# Patient Record
Sex: FEMALE | Race: OTHER | Employment: OTHER | URBAN - METROPOLITAN AREA
[De-identification: names, ages, dates, MRNs, and addresses within clinical notes are randomized per-mention and may not be internally consistent; named-entity substitution may affect disease eponyms.]

---

## 2017-02-09 ENCOUNTER — OFFICE VISIT (OUTPATIENT)
Dept: INTERNAL MEDICINE CLINIC | Facility: CLINIC | Age: 72
End: 2017-02-09

## 2017-02-09 VITALS
WEIGHT: 160 LBS | DIASTOLIC BLOOD PRESSURE: 74 MMHG | HEART RATE: 68 BPM | HEIGHT: 60.7 IN | SYSTOLIC BLOOD PRESSURE: 110 MMHG | OXYGEN SATURATION: 97 % | BODY MASS INDEX: 30.6 KG/M2

## 2017-02-09 DIAGNOSIS — N30.90 CYSTITIS: ICD-10-CM

## 2017-02-09 DIAGNOSIS — M81.0 POSTMENOPAUSAL OSTEOPOROSIS: ICD-10-CM

## 2017-02-09 DIAGNOSIS — Z00.00 ANNUAL PHYSICAL EXAM: Primary | ICD-10-CM

## 2017-02-09 DIAGNOSIS — Z23 NEED FOR INFLUENZA VACCINATION: ICD-10-CM

## 2017-02-09 DIAGNOSIS — M15.9 PRIMARY OSTEOARTHRITIS INVOLVING MULTIPLE JOINTS: ICD-10-CM

## 2017-02-09 DIAGNOSIS — E78.2 MIXED HYPERLIPIDEMIA: ICD-10-CM

## 2017-02-09 DIAGNOSIS — E03.9 HYPOTHYROIDISM (ACQUIRED): ICD-10-CM

## 2017-02-09 DIAGNOSIS — F41.1 GENERALIZED ANXIETY DISORDER: ICD-10-CM

## 2017-02-09 LAB
ALBUMIN SERPL BCP-MCNC: 4.2 G/DL (ref 3.5–4.8)
ALBUMIN/GLOB SERPL: 1.2 {RATIO} (ref 1–2)
ALP SERPL-CCNC: 69 U/L (ref 32–100)
ALT SERPL-CCNC: 18 U/L (ref 14–54)
ANION GAP SERPL CALC-SCNC: 6 MMOL/L (ref 0–18)
AST SERPL-CCNC: 23 U/L (ref 15–41)
BILIRUB SERPL-MCNC: 0.8 MG/DL (ref 0.3–1.2)
BUN SERPL-MCNC: 15 MG/DL (ref 8–20)
BUN/CREAT SERPL: 16.3 (ref 10–20)
CALCIUM SERPL-MCNC: 9.8 MG/DL (ref 8.5–10.5)
CHLORIDE SERPL-SCNC: 108 MMOL/L (ref 95–110)
CHOLEST SERPL-MCNC: 157 MG/DL (ref 110–200)
CO2 SERPL-SCNC: 28 MMOL/L (ref 22–32)
CREAT SERPL-MCNC: 0.92 MG/DL (ref 0.5–1.5)
GLOBULIN PLAS-MCNC: 3.4 G/DL (ref 2.5–3.7)
GLUCOSE SERPL-MCNC: 92 MG/DL (ref 70–99)
HDLC SERPL-MCNC: 44 MG/DL
LDLC SERPL CALC-MCNC: 94 MG/DL (ref 0–99)
MULTISTIX LOT#: ABNORMAL NUMERIC
NITRITE, URINE: POSITIVE
NONHDLC SERPL-MCNC: 113 MG/DL
OSMOLALITY UR CALC.SUM OF ELEC: 294 MOSM/KG (ref 275–295)
PH, URINE: 7 (ref 4.5–8)
POTASSIUM SERPL-SCNC: 4.2 MMOL/L (ref 3.3–5.1)
PROT SERPL-MCNC: 7.6 G/DL (ref 5.9–8.4)
PROTEIN (URINE DIPSTICK): 30 MG/DL
SODIUM SERPL-SCNC: 142 MMOL/L (ref 136–144)
SPECIFIC GRAVITY: 1.02 (ref 1–1.03)
TRIGL SERPL-MCNC: 96 MG/DL (ref 1–149)
TSH SERPL-ACNC: 2.56 UIU/ML (ref 0.34–5.6)
UROBILINOGEN,SEMI-QN: 0.2 MG/DL (ref 0–1.9)

## 2017-02-09 PROCEDURE — 84443 ASSAY THYROID STIM HORMONE: CPT | Performed by: FAMILY MEDICINE

## 2017-02-09 PROCEDURE — 80061 LIPID PANEL: CPT | Performed by: FAMILY MEDICINE

## 2017-02-09 PROCEDURE — G0008 ADMIN INFLUENZA VIRUS VAC: HCPCS | Performed by: FAMILY MEDICINE

## 2017-02-09 PROCEDURE — 99213 OFFICE O/P EST LOW 20 MIN: CPT | Performed by: FAMILY MEDICINE

## 2017-02-09 PROCEDURE — 81003 URINALYSIS AUTO W/O SCOPE: CPT | Performed by: FAMILY MEDICINE

## 2017-02-09 PROCEDURE — 80053 COMPREHEN METABOLIC PANEL: CPT | Performed by: FAMILY MEDICINE

## 2017-02-09 PROCEDURE — 90662 IIV NO PRSV INCREASED AG IM: CPT | Performed by: FAMILY MEDICINE

## 2017-02-09 PROCEDURE — 82306 VITAMIN D 25 HYDROXY: CPT | Performed by: FAMILY MEDICINE

## 2017-02-09 RX ORDER — CIPROFLOXACIN 250 MG/1
250 TABLET, FILM COATED ORAL 2 TIMES DAILY
Qty: 10 TABLET | Refills: 0 | Status: SHIPPED | OUTPATIENT
Start: 2017-02-09 | End: 2017-02-14

## 2017-02-09 NOTE — PROGRESS NOTES
CC:  Physical      Hx of CC:  ANNUAL MEDICARE PHYSICAL AND FOLLOWING UP ON CHRONIC CONDITIONS. ONE WEEK WITH URINARY FREQUENCY AND DYSURIA.     Vitals:    02/09/17  1024   BP: 110/74   Pulse: 68   Height: 60.7\"   Weight: 160 lb   SpO2: 97%         Body ma

## 2017-02-09 NOTE — PROGRESS NOTES
Pt presented to clinic today for blood draw. Per physician able to draw orders. Orders  documented within chart. Pt tolerated lab draw well.  verified.   Orders drawn include: CMP, lipid, TSH and VitD  Site of draw: right arm  ASHANTI Wang

## 2017-02-10 LAB — 25(OH)D3 SERPL-MCNC: 40.3 NG/ML

## 2017-02-21 ENCOUNTER — OFFICE VISIT (OUTPATIENT)
Dept: ORTHOPEDICS CLINIC | Facility: CLINIC | Age: 72
End: 2017-02-21

## 2017-02-21 ENCOUNTER — HOSPITAL ENCOUNTER (OUTPATIENT)
Dept: GENERAL RADIOLOGY | Facility: HOSPITAL | Age: 72
Discharge: HOME | End: 2017-02-21
Attending: ORTHOPAEDIC SURGERY | Admitting: ORTHOPAEDIC SURGERY
Payer: MEDICARE

## 2017-02-21 DIAGNOSIS — S42.412D CLOSED DISPLACED SIMPLE SUPRACONDYLAR FRACTURE OF LEFT HUMERUS WITHOUT INTERCONDYLAR FRACTURE WITH ROUTINE HEALING, SUBSEQUENT ENCOUNTER: ICD-10-CM

## 2017-02-21 DIAGNOSIS — Z47.89 ORTHOPEDIC AFTERCARE: ICD-10-CM

## 2017-02-21 DIAGNOSIS — Z47.89 ORTHOPEDIC AFTERCARE: Primary | ICD-10-CM

## 2017-02-21 PROCEDURE — 99024 POSTOP FOLLOW-UP VISIT: CPT | Performed by: ORTHOPAEDIC SURGERY

## 2017-02-21 PROCEDURE — G0463 HOSPITAL OUTPT CLINIC VISIT: HCPCS | Performed by: ORTHOPAEDIC SURGERY

## 2017-02-21 PROCEDURE — 73080 X-RAY EXAM OF ELBOW: CPT

## 2017-02-21 NOTE — PROGRESS NOTES
2/21/2017  Odin Fish  6/29/1945  66year old   female  Blaze Roberts MD    HPI:   Patient presents with:  Post-Op: s/p L elbow 3 mo f/u - here with her nephew who translates for her  - states she feels like she has a rubberband inside the elbow an supracondylar fracture of left humerus without intercondylar fracture with routine healing, subsequent encounter, 3 mo s/p ORIF left distal humerus    The patient has evidence of healing both clinically and radiographically and may now advance activities a

## 2017-03-06 RX ORDER — ATENOLOL 50 MG/1
TABLET ORAL
Qty: 90 TABLET | Refills: 0 | Status: SHIPPED | OUTPATIENT
Start: 2017-03-06 | End: 2017-07-01

## 2017-03-06 RX ORDER — RANITIDINE 150 MG/1
TABLET ORAL
Qty: 90 TABLET | Refills: 0 | Status: SHIPPED | OUTPATIENT
Start: 2017-03-06 | End: 2017-07-01

## 2017-03-06 RX ORDER — LEVOTHYROXINE SODIUM 0.03 MG/1
TABLET ORAL
Qty: 90 TABLET | Refills: 0 | Status: SHIPPED | OUTPATIENT
Start: 2017-03-06 | End: 2017-07-01

## 2017-04-06 DIAGNOSIS — E78.2 MIXED HYPERLIPIDEMIA: Primary | ICD-10-CM

## 2017-04-06 RX ORDER — SIMVASTATIN 40 MG
TABLET ORAL
Qty: 90 TABLET | Refills: 1 | Status: SHIPPED | OUTPATIENT
Start: 2017-04-06 | End: 2017-07-01

## 2017-07-01 ENCOUNTER — OFFICE VISIT (OUTPATIENT)
Dept: INTERNAL MEDICINE CLINIC | Facility: CLINIC | Age: 72
End: 2017-07-01

## 2017-07-01 VITALS
HEIGHT: 60.7 IN | DIASTOLIC BLOOD PRESSURE: 70 MMHG | OXYGEN SATURATION: 97 % | BODY MASS INDEX: 31.18 KG/M2 | TEMPERATURE: 98 F | SYSTOLIC BLOOD PRESSURE: 120 MMHG | WEIGHT: 163 LBS | HEART RATE: 64 BPM

## 2017-07-01 DIAGNOSIS — F41.1 GENERALIZED ANXIETY DISORDER: ICD-10-CM

## 2017-07-01 DIAGNOSIS — Z12.31 VISIT FOR SCREENING MAMMOGRAM: ICD-10-CM

## 2017-07-01 DIAGNOSIS — E78.2 MIXED HYPERLIPIDEMIA: ICD-10-CM

## 2017-07-01 DIAGNOSIS — G44.219 EPISODIC TENSION-TYPE HEADACHE, NOT INTRACTABLE: ICD-10-CM

## 2017-07-01 DIAGNOSIS — H52.13 MYOPIA, BILATERAL: Primary | ICD-10-CM

## 2017-07-01 PROBLEM — H52.10 MYOPIA: Status: ACTIVE | Noted: 2017-07-01

## 2017-07-01 PROCEDURE — 99214 OFFICE O/P EST MOD 30 MIN: CPT | Performed by: FAMILY MEDICINE

## 2017-07-01 RX ORDER — LEVOTHYROXINE SODIUM 0.03 MG/1
25 TABLET ORAL
Qty: 90 TABLET | Refills: 1 | Status: SHIPPED | OUTPATIENT
Start: 2017-07-01 | End: 2017-12-26

## 2017-07-01 RX ORDER — SIMVASTATIN 40 MG
40 TABLET ORAL NIGHTLY
Qty: 90 TABLET | Refills: 1 | Status: SHIPPED | OUTPATIENT
Start: 2017-07-01 | End: 2018-01-22

## 2017-07-01 RX ORDER — ALPRAZOLAM 0.5 MG/1
TABLET ORAL
Qty: 60 TABLET | Refills: 5 | Status: SHIPPED | OUTPATIENT
Start: 2017-07-01 | End: 2018-03-17

## 2017-07-01 RX ORDER — SERTRALINE HYDROCHLORIDE 100 MG/1
100 TABLET, FILM COATED ORAL
Qty: 90 TABLET | Refills: 1 | Status: SHIPPED | OUTPATIENT
Start: 2017-07-01 | End: 2018-10-01

## 2017-07-01 RX ORDER — ATENOLOL 50 MG/1
50 TABLET ORAL DAILY
Qty: 90 TABLET | Refills: 1 | Status: SHIPPED | OUTPATIENT
Start: 2017-07-01 | End: 2017-12-26

## 2017-07-01 RX ORDER — RANITIDINE 150 MG/1
150 TABLET ORAL NIGHTLY
Qty: 90 TABLET | Refills: 1 | Status: SHIPPED | OUTPATIENT
Start: 2017-07-01 | End: 2017-12-26

## 2017-07-01 NOTE — PROGRESS NOTES
CC:  Eye Problem (pt presents to clinic c/o eye pressure. needs order for mammogram and optho )      Hx of CC:  SEVERAL WEEKS WITH FRONTAL PAIN/HEADACHE. NOT PRESENT IN THE MORNINGS ON AWAKENING BUT DEVELOPS AS DAY GOES BY.   PATIENT HAS A COUPLE DIFFERENT AFUA SCREENING BILAT (CPT=77067); Future    OPHTHALMOLOGY - INTERNAL  AFUA SCREENING BILAT (CPT=77067)  No orders of the defined types were placed in this encounter.

## 2017-08-12 ENCOUNTER — HOSPITAL ENCOUNTER (OUTPATIENT)
Dept: MAMMOGRAPHY | Facility: HOSPITAL | Age: 72
Discharge: HOME | End: 2017-08-12
Attending: FAMILY MEDICINE
Payer: MEDICARE

## 2017-08-12 DIAGNOSIS — Z12.31 VISIT FOR SCREENING MAMMOGRAM: ICD-10-CM

## 2017-08-12 PROCEDURE — 77067 SCR MAMMO BI INCL CAD: CPT | Performed by: FAMILY MEDICINE

## 2017-10-31 ENCOUNTER — OFFICE VISIT (OUTPATIENT)
Dept: INTERNAL MEDICINE CLINIC | Facility: CLINIC | Age: 72
End: 2017-10-31

## 2017-10-31 VITALS
OXYGEN SATURATION: 99 % | WEIGHT: 161 LBS | RESPIRATION RATE: 17 BRPM | HEIGHT: 60.7 IN | DIASTOLIC BLOOD PRESSURE: 88 MMHG | SYSTOLIC BLOOD PRESSURE: 134 MMHG | HEART RATE: 59 BPM | BODY MASS INDEX: 30.79 KG/M2

## 2017-10-31 DIAGNOSIS — Z00.00 ANNUAL PHYSICAL EXAM: Primary | ICD-10-CM

## 2017-10-31 DIAGNOSIS — R09.82 POST-NASAL DRIP: ICD-10-CM

## 2017-10-31 DIAGNOSIS — J30.89 CHRONIC NON-SEASONAL ALLERGIC RHINITIS, UNSPECIFIED TRIGGER: ICD-10-CM

## 2017-10-31 DIAGNOSIS — E03.9 HYPOTHYROIDISM (ACQUIRED): ICD-10-CM

## 2017-10-31 DIAGNOSIS — M81.0 OSTEOPOROSIS, POST-MENOPAUSAL: ICD-10-CM

## 2017-10-31 DIAGNOSIS — Z23 NEED FOR INFLUENZA VACCINATION: ICD-10-CM

## 2017-10-31 DIAGNOSIS — E78.2 MIXED HYPERLIPIDEMIA: ICD-10-CM

## 2017-10-31 PROCEDURE — 80061 LIPID PANEL: CPT | Performed by: FAMILY MEDICINE

## 2017-10-31 PROCEDURE — 80053 COMPREHEN METABOLIC PANEL: CPT | Performed by: FAMILY MEDICINE

## 2017-10-31 PROCEDURE — G0439 PPPS, SUBSEQ VISIT: HCPCS | Performed by: FAMILY MEDICINE

## 2017-10-31 PROCEDURE — G0008 ADMIN INFLUENZA VIRUS VAC: HCPCS | Performed by: FAMILY MEDICINE

## 2017-10-31 PROCEDURE — 84443 ASSAY THYROID STIM HORMONE: CPT | Performed by: FAMILY MEDICINE

## 2017-10-31 PROCEDURE — 90686 IIV4 VACC NO PRSV 0.5 ML IM: CPT | Performed by: FAMILY MEDICINE

## 2017-10-31 RX ORDER — DIPHENHYDRAMINE HYDROCHLORIDE 12.5 MG/1
12.5 BAR, CHEWABLE ORAL NIGHTLY PRN
Qty: 90 TABLET | Refills: 0 | Status: SHIPPED | OUTPATIENT
Start: 2017-10-31 | End: 2019-12-07 | Stop reason: ALTCHOICE

## 2017-10-31 RX ORDER — FLUTICASONE PROPIONATE 50 MCG
1 SPRAY, SUSPENSION (ML) NASAL DAILY
Qty: 3 BOTTLE | Refills: 1 | Status: SHIPPED | OUTPATIENT
Start: 2017-10-31 | End: 2021-03-09

## 2017-10-31 RX ORDER — ALENDRONATE SODIUM 35 MG/1
35 TABLET ORAL
Qty: 12 TABLET | Refills: 3 | Status: SHIPPED | OUTPATIENT
Start: 2017-10-31 | End: 2019-01-07

## 2017-10-31 NOTE — PROGRESS NOTES
HPI:   Kristin Dueñas is a 74year old female who presents for a Medicare Subsequent Annual Wellness visit (Pt already had Initial Annual Wellness).          Patient Care Team: Patient Care Team:  Dana Hansen DO as PCP - General (Family Medicine)  [facility] 100 MG Oral Tab Take 1 tablet (100 mg total) by mouth once daily. ALPRAZolam 0.5 MG Oral Tab TAKE ONE TABLET BY MOUTH TWO TIMES A DAY AS NEEDED   Calcium Carbonate-Vitamin D (CALCIUM + D) 250-125 MG-UNIT Oral Tab Take 1 tablet by mouth.    Omega-3-acid Et the following:    Height as of this encounter: 60.7\". Weight as of this encounter: 161 lb.     Medicare Hearing Assessment  (Required for AWV/SWV)    Whispered Voice     Visual Acuity  Right Eye Visual Acuity: Corrected Right Eye Chart Acuity: 20/25   L Oral Tab; Take 1 tablet (35 mg total) by mouth every 7 days. CONTINUE WITH CALCIUM AND VITAMIN D SUPPLEMENTS      HAD COLONOSCOPY WITH DR. April Espinoza ON 2015-->ONE POLYP EXCISED   UP TO DATE ON MAMMO    Ms. Rojelio Chris does not currently take aspirin.   Diet Fall/Risk Assessment          Have you fallen in the last 12 months?: 0-No                                        Fall/Risk Scorin          Depression Screening (PHQ-2/PHQ-9): Over the LAST 2 WEEKS   Little interest or pleasure in doing things (ove Annually: Diabetics, FHx Glaucoma, AA>50, > 65 No flowsheet data found. Bone Density Screening      Dexascan Every two years No results found for this or any previous visit. No flowsheet data found.     Pap and Pelvic      Pap: Every 3 yrs age 22 Value   02/09/2017 4.2     POTASSIUM (P) (mmol/L)   Date Value   04/18/2016 4.1    No flowsheet data found.     Creatinine  Annually Creatinine (mg/dL)   Date Value   02/09/2017 0.92     CREATININE (P) (mg/dL)   Date Value   04/18/2016 0.81    No flowsheet

## 2017-12-26 DIAGNOSIS — G44.219 EPISODIC TENSION-TYPE HEADACHE, NOT INTRACTABLE: ICD-10-CM

## 2017-12-26 RX ORDER — LEVOTHYROXINE SODIUM 0.03 MG/1
TABLET ORAL
Qty: 90 TABLET | Refills: 0 | Status: SHIPPED | OUTPATIENT
Start: 2017-12-26 | End: 2018-03-26

## 2017-12-26 RX ORDER — RANITIDINE 150 MG/1
TABLET ORAL
Qty: 90 TABLET | Refills: 0 | Status: SHIPPED | OUTPATIENT
Start: 2017-12-26 | End: 2018-03-26

## 2017-12-26 RX ORDER — ATENOLOL 50 MG/1
TABLET ORAL
Qty: 90 TABLET | Refills: 0 | Status: SHIPPED | OUTPATIENT
Start: 2017-12-26 | End: 2018-03-26

## 2018-01-22 DIAGNOSIS — E78.2 MIXED HYPERLIPIDEMIA: ICD-10-CM

## 2018-01-22 RX ORDER — SIMVASTATIN 40 MG
TABLET ORAL
Qty: 90 TABLET | Refills: 0 | Status: SHIPPED | OUTPATIENT
Start: 2018-01-22 | End: 2018-04-25

## 2018-03-17 ENCOUNTER — OFFICE VISIT (OUTPATIENT)
Dept: INTERNAL MEDICINE CLINIC | Facility: CLINIC | Age: 73
End: 2018-03-17

## 2018-03-17 VITALS
HEART RATE: 62 BPM | OXYGEN SATURATION: 98 % | DIASTOLIC BLOOD PRESSURE: 84 MMHG | BODY MASS INDEX: 30.98 KG/M2 | SYSTOLIC BLOOD PRESSURE: 140 MMHG | TEMPERATURE: 99 F | HEIGHT: 60.7 IN | WEIGHT: 162 LBS

## 2018-03-17 DIAGNOSIS — F41.1 GENERALIZED ANXIETY DISORDER: ICD-10-CM

## 2018-03-17 DIAGNOSIS — J98.01 POST-INFECTION BRONCHOSPASM: Primary | ICD-10-CM

## 2018-03-17 PROCEDURE — 99213 OFFICE O/P EST LOW 20 MIN: CPT | Performed by: FAMILY MEDICINE

## 2018-03-17 RX ORDER — BUDESONIDE AND FORMOTEROL FUMARATE DIHYDRATE 160; 4.5 UG/1; UG/1
2 AEROSOL RESPIRATORY (INHALATION) 2 TIMES DAILY
Qty: 1 INHALER | Refills: 1 | Status: SHIPPED | OUTPATIENT
Start: 2018-03-17 | End: 2019-08-08 | Stop reason: ALTCHOICE

## 2018-03-17 RX ORDER — ALPRAZOLAM 0.5 MG/1
TABLET ORAL
Qty: 60 TABLET | Refills: 5 | Status: SHIPPED | OUTPATIENT
Start: 2018-03-17 | End: 2018-10-11

## 2018-03-17 NOTE — PROGRESS NOTES
CC:  URI (Pt presents to clinic for c/o chest congestion and cough x 2 weeks. Took 5 days of Penicillin that she had from [facility].)      Hx of CC:  HAD URI 2 WEEKS AGO WITH PERSISTENT COUGH AND RESIDUAL RHINITIS SINCE.   NEEDING REFILL ON ALPRAZOLAM--MOOD OK

## 2018-03-26 DIAGNOSIS — G44.219 EPISODIC TENSION-TYPE HEADACHE, NOT INTRACTABLE: ICD-10-CM

## 2018-03-26 RX ORDER — LEVOTHYROXINE SODIUM 0.03 MG/1
TABLET ORAL
Qty: 90 TABLET | Refills: 0 | Status: SHIPPED | OUTPATIENT
Start: 2018-03-26 | End: 2018-06-28

## 2018-03-26 RX ORDER — ATENOLOL 50 MG/1
TABLET ORAL
Qty: 90 TABLET | Refills: 0 | Status: SHIPPED | OUTPATIENT
Start: 2018-03-26 | End: 2018-06-26

## 2018-03-26 RX ORDER — RANITIDINE 150 MG/1
TABLET ORAL
Qty: 90 TABLET | Refills: 0 | Status: SHIPPED | OUTPATIENT
Start: 2018-03-26 | End: 2018-10-11

## 2018-04-25 DIAGNOSIS — E78.2 MIXED HYPERLIPIDEMIA: ICD-10-CM

## 2018-04-25 RX ORDER — SIMVASTATIN 40 MG
TABLET ORAL
Qty: 90 TABLET | Refills: 0 | Status: SHIPPED | OUTPATIENT
Start: 2018-04-25 | End: 2018-07-24

## 2018-06-18 ENCOUNTER — TELEPHONE (OUTPATIENT)
Dept: INTERNAL MEDICINE CLINIC | Facility: CLINIC | Age: 73
End: 2018-06-18

## 2018-06-18 DIAGNOSIS — Z12.39 BREAST CANCER SCREENING: Primary | ICD-10-CM

## 2018-06-26 DIAGNOSIS — G44.219 EPISODIC TENSION-TYPE HEADACHE, NOT INTRACTABLE: ICD-10-CM

## 2018-06-26 RX ORDER — ATENOLOL 50 MG/1
TABLET ORAL
Qty: 90 TABLET | Refills: 0 | Status: SHIPPED | OUTPATIENT
Start: 2018-06-26 | End: 2018-10-01

## 2018-06-28 RX ORDER — LEVOTHYROXINE SODIUM 0.03 MG/1
TABLET ORAL
Qty: 90 TABLET | Refills: 0 | Status: SHIPPED | OUTPATIENT
Start: 2018-06-28 | End: 2018-10-01

## 2018-07-24 DIAGNOSIS — E78.2 MIXED HYPERLIPIDEMIA: ICD-10-CM

## 2018-07-24 RX ORDER — SIMVASTATIN 40 MG
TABLET ORAL
Qty: 90 TABLET | Refills: 0 | Status: SHIPPED | OUTPATIENT
Start: 2018-07-24 | End: 2018-10-22

## 2018-08-21 DIAGNOSIS — M15.9 PRIMARY OSTEOARTHRITIS INVOLVING MULTIPLE JOINTS: Primary | ICD-10-CM

## 2018-10-01 DIAGNOSIS — G44.219 EPISODIC TENSION-TYPE HEADACHE, NOT INTRACTABLE: ICD-10-CM

## 2018-10-01 DIAGNOSIS — F41.1 GENERALIZED ANXIETY DISORDER: ICD-10-CM

## 2018-10-01 RX ORDER — ATENOLOL 50 MG/1
TABLET ORAL
Qty: 90 TABLET | Refills: 0 | Status: SHIPPED | OUTPATIENT
Start: 2018-10-01 | End: 2019-01-07

## 2018-10-01 RX ORDER — LEVOTHYROXINE SODIUM 0.03 MG/1
TABLET ORAL
Qty: 90 TABLET | Refills: 0 | Status: SHIPPED | OUTPATIENT
Start: 2018-10-01 | End: 2019-01-07

## 2018-10-01 RX ORDER — SERTRALINE HYDROCHLORIDE 100 MG/1
TABLET, FILM COATED ORAL
Qty: 90 TABLET | Refills: 0 | Status: SHIPPED | OUTPATIENT
Start: 2018-10-01 | End: 2019-01-07

## 2018-10-01 NOTE — TELEPHONE ENCOUNTER
A refill request was received for:  Requested Prescriptions     Pending Prescriptions Disp Refills   • ATENOLOL 50 MG Oral Tab [Pharmacy Med Name: Atenolol Oral Tablet 50 MG] 90 tablet 0     Sig: TAKE ONE TABLET BY MOUTH ONE TIME DAILY   • SERTRALINE HCL 1

## 2018-10-06 ENCOUNTER — HOSPITAL ENCOUNTER (OUTPATIENT)
Dept: MAMMOGRAPHY | Facility: HOSPITAL | Age: 73
Discharge: HOME | End: 2018-10-06
Attending: FAMILY MEDICINE
Payer: MEDICARE

## 2018-10-06 DIAGNOSIS — Z12.39 BREAST CANCER SCREENING: ICD-10-CM

## 2018-10-06 PROCEDURE — 77063 BREAST TOMOSYNTHESIS BI: CPT | Performed by: FAMILY MEDICINE

## 2018-10-06 PROCEDURE — 77067 SCR MAMMO BI INCL CAD: CPT | Performed by: FAMILY MEDICINE

## 2018-10-11 ENCOUNTER — OFFICE VISIT (OUTPATIENT)
Dept: INTERNAL MEDICINE CLINIC | Facility: CLINIC | Age: 73
End: 2018-10-11
Payer: MEDICARE

## 2018-10-11 VITALS
HEART RATE: 91 BPM | OXYGEN SATURATION: 99 % | WEIGHT: 163 LBS | SYSTOLIC BLOOD PRESSURE: 140 MMHG | HEIGHT: 60.7 IN | DIASTOLIC BLOOD PRESSURE: 92 MMHG | BODY MASS INDEX: 31.18 KG/M2 | RESPIRATION RATE: 17 BRPM

## 2018-10-11 DIAGNOSIS — Z23 NEED FOR INFLUENZA VACCINATION: ICD-10-CM

## 2018-10-11 DIAGNOSIS — K29.50 CHRONIC GASTRITIS WITHOUT BLEEDING, UNSPECIFIED GASTRITIS TYPE: ICD-10-CM

## 2018-10-11 DIAGNOSIS — N30.90 CYSTITIS: Primary | ICD-10-CM

## 2018-10-11 DIAGNOSIS — F41.1 GENERALIZED ANXIETY DISORDER: ICD-10-CM

## 2018-10-11 PROCEDURE — 87186 SC STD MICRODIL/AGAR DIL: CPT | Performed by: FAMILY MEDICINE

## 2018-10-11 PROCEDURE — 99213 OFFICE O/P EST LOW 20 MIN: CPT | Performed by: FAMILY MEDICINE

## 2018-10-11 PROCEDURE — 87088 URINE BACTERIA CULTURE: CPT | Performed by: FAMILY MEDICINE

## 2018-10-11 PROCEDURE — G0008 ADMIN INFLUENZA VIRUS VAC: HCPCS | Performed by: FAMILY MEDICINE

## 2018-10-11 PROCEDURE — 90653 IIV ADJUVANT VACCINE IM: CPT | Performed by: FAMILY MEDICINE

## 2018-10-11 PROCEDURE — 87086 URINE CULTURE/COLONY COUNT: CPT | Performed by: FAMILY MEDICINE

## 2018-10-11 PROCEDURE — 81003 URINALYSIS AUTO W/O SCOPE: CPT | Performed by: FAMILY MEDICINE

## 2018-10-11 RX ORDER — NITROFURANTOIN 25; 75 MG/1; MG/1
100 CAPSULE ORAL 2 TIMES DAILY
Qty: 14 CAPSULE | Refills: 0 | Status: SHIPPED | OUTPATIENT
Start: 2018-10-11 | End: 2018-12-11 | Stop reason: ALTCHOICE

## 2018-10-11 RX ORDER — RANITIDINE 150 MG/1
150 TABLET ORAL NIGHTLY
Qty: 90 TABLET | Refills: 1 | Status: SHIPPED | OUTPATIENT
Start: 2018-10-11 | End: 2019-05-13

## 2018-10-11 RX ORDER — ALPRAZOLAM 0.5 MG/1
TABLET ORAL
Qty: 60 TABLET | Refills: 5 | Status: SHIPPED | OUTPATIENT
Start: 2018-10-11 | End: 2019-05-13

## 2018-10-13 NOTE — PROGRESS NOTES
CC: UTI (Pt presents to clinic for c/o burning with urination and increased urinary frequency x 5 wks. )      Hx of CC:  ONSET OF DYSURIA AND FREQUENCY WHILE IN [place] 5 WEEKS AGO--TOOK SOME ANTIBIOTICS BUT SYMPTOMS NEVER FULLY RESOLVED.     NEEDING ME

## 2018-10-22 DIAGNOSIS — M15.9 PRIMARY OSTEOARTHRITIS INVOLVING MULTIPLE JOINTS: ICD-10-CM

## 2018-10-22 DIAGNOSIS — E78.2 MIXED HYPERLIPIDEMIA: ICD-10-CM

## 2018-10-22 RX ORDER — SIMVASTATIN 40 MG
TABLET ORAL
Qty: 90 TABLET | Refills: 0 | Status: SHIPPED | OUTPATIENT
Start: 2018-10-22 | End: 2019-01-07

## 2018-11-19 DIAGNOSIS — M15.9 PRIMARY OSTEOARTHRITIS INVOLVING MULTIPLE JOINTS: ICD-10-CM

## 2018-12-11 ENCOUNTER — OFFICE VISIT (OUTPATIENT)
Dept: INTERNAL MEDICINE CLINIC | Facility: CLINIC | Age: 73
End: 2018-12-11
Payer: MEDICARE

## 2018-12-11 VITALS
RESPIRATION RATE: 18 BRPM | DIASTOLIC BLOOD PRESSURE: 90 MMHG | WEIGHT: 172 LBS | BODY MASS INDEX: 32.9 KG/M2 | HEART RATE: 68 BPM | OXYGEN SATURATION: 99 % | SYSTOLIC BLOOD PRESSURE: 142 MMHG | HEIGHT: 60.7 IN

## 2018-12-11 DIAGNOSIS — E03.9 HYPOTHYROIDISM (ACQUIRED): ICD-10-CM

## 2018-12-11 DIAGNOSIS — Z00.00 ANNUAL PHYSICAL EXAM: Primary | ICD-10-CM

## 2018-12-11 DIAGNOSIS — E78.2 MIXED HYPERLIPIDEMIA: ICD-10-CM

## 2018-12-11 DIAGNOSIS — F41.1 GENERALIZED ANXIETY DISORDER: ICD-10-CM

## 2018-12-11 DIAGNOSIS — Z01.00 ENCOUNTER FOR VISION SCREENING: ICD-10-CM

## 2018-12-11 DIAGNOSIS — M15.9 PRIMARY OSTEOARTHRITIS INVOLVING MULTIPLE JOINTS: ICD-10-CM

## 2018-12-11 DIAGNOSIS — R00.2 INTERMITTENT PALPITATIONS: ICD-10-CM

## 2018-12-11 DIAGNOSIS — E03.9 ACQUIRED HYPOTHYROIDISM: ICD-10-CM

## 2018-12-11 DIAGNOSIS — H52.13 MYOPIA OF BOTH EYES: ICD-10-CM

## 2018-12-11 DIAGNOSIS — M81.0 OSTEOPOROSIS, POST-MENOPAUSAL: ICD-10-CM

## 2018-12-11 PROCEDURE — 80053 COMPREHEN METABOLIC PANEL: CPT | Performed by: FAMILY MEDICINE

## 2018-12-11 PROCEDURE — 36415 COLL VENOUS BLD VENIPUNCTURE: CPT | Performed by: FAMILY MEDICINE

## 2018-12-11 PROCEDURE — 93000 ELECTROCARDIOGRAM COMPLETE: CPT | Performed by: FAMILY MEDICINE

## 2018-12-11 PROCEDURE — 84443 ASSAY THYROID STIM HORMONE: CPT | Performed by: FAMILY MEDICINE

## 2018-12-11 PROCEDURE — G0439 PPPS, SUBSEQ VISIT: HCPCS | Performed by: FAMILY MEDICINE

## 2018-12-11 PROCEDURE — 80061 LIPID PANEL: CPT | Performed by: FAMILY MEDICINE

## 2018-12-11 NOTE — PROGRESS NOTES
Pt presented to clinic today for blood draw. Per physician able to draw orders. Orders  documented within chart. Pt tolerated lab draw well.  verified.   Orders drawn include: tsh, lipid, cmp   Site of draw: rt arm   Roger Boom, [facility]

## 2018-12-11 NOTE — PROGRESS NOTES
HPI:   Erik Quevedo is a 74year old female who presents for a Medicare Annual Wellness visit.     Patient Active Problem List:     Acquired hypothyroidism     Mixed hyperlipidemia     Generalized anxiety disorder     Primary osteoarthritis involving m hazards?: 1-Yes    Are you on multiple medications?: 1-Yes    Does pain affect your day to day activities?: 1-Yes     Have you had any memory issues?: 1-Yes    Fall/Risk Scorin    Scoring Interpretation: 4+ At Risk     Depression Screening (PHQ-2/PHQ-9 MCG/ACT Inhalation Aerosol Inhale 2 puffs into the lungs 2 (two) times daily. Disp: 1 Inhaler Rfl: 1   Fluticasone Propionate 50 MCG/ACT Nasal Suspension 1 spray by Each Nare route daily.  Disp: 3 Bottle Rfl: 1   DiphenhydrAMINE HCl 12.5 MG Oral Chew Tab Ch depression or anxiety--SYMPTOMS STABLE ON MEDICINES  HEMATOLOGIC: denies hx of anemia  ENDOCRINE: MILDLY LOW THYROID--ON LEVOXYL  ALL/ASTHMA: denies hx of allergy or asthma    EXAM:   /90 (BP Location: Right arm, Patient Position: Sitting, Cuff Size: ON SSRI AND ALPRAZOLAM AT HS    Primary osteoarthritis involving multiple joints:  CONTROLLED WITH ONE DICLOFENAC DAILY    Myopia of both eyes:  REFERRED TO OPHTHALMOLOGY FOR YEARLY EXAM    Intermittent palpitations  -     ELECTROCARDIOGRAM, COMPLETE    Ac for this patient. Update Health Maintenance if applicable   Immunizations      Influenza No orders found for this or any previous visit. Update Immunization Activity if applicable    Pneumococcal No orders found for this or any previous visit.  Update Immun Colonoscopy due on 04/29/2025   FBS All Patients q1 year No results found for: GLUCOSE   Glaucoma If at high risk q1 year REFERRED FOR EXAM   Pap If at high risk q1 year There are no preventive care reminders to display for this patient.    Pap All Patients

## 2019-01-07 ENCOUNTER — TELEPHONE (OUTPATIENT)
Dept: INTERNAL MEDICINE CLINIC | Facility: CLINIC | Age: 74
End: 2019-01-07

## 2019-01-07 DIAGNOSIS — G44.219 EPISODIC TENSION-TYPE HEADACHE, NOT INTRACTABLE: ICD-10-CM

## 2019-01-07 DIAGNOSIS — F41.1 GENERALIZED ANXIETY DISORDER: ICD-10-CM

## 2019-01-07 DIAGNOSIS — E03.9 ACQUIRED HYPOTHYROIDISM: Primary | ICD-10-CM

## 2019-01-07 DIAGNOSIS — E78.2 MIXED HYPERLIPIDEMIA: ICD-10-CM

## 2019-01-07 DIAGNOSIS — M81.0 OSTEOPOROSIS, POST-MENOPAUSAL: ICD-10-CM

## 2019-01-07 RX ORDER — ALENDRONATE SODIUM 35 MG/1
35 TABLET ORAL
Qty: 12 TABLET | Refills: 3 | Status: SHIPPED | OUTPATIENT
Start: 2019-01-07 | End: 2019-05-13

## 2019-01-07 RX ORDER — ATENOLOL 50 MG/1
50 TABLET ORAL
Qty: 90 TABLET | Refills: 3 | Status: SHIPPED | OUTPATIENT
Start: 2019-01-07 | End: 2019-05-13

## 2019-01-07 RX ORDER — SERTRALINE HYDROCHLORIDE 100 MG/1
100 TABLET, FILM COATED ORAL
Qty: 90 TABLET | Refills: 3 | Status: SHIPPED | OUTPATIENT
Start: 2019-01-07 | End: 2019-05-13

## 2019-01-07 RX ORDER — SIMVASTATIN 40 MG
TABLET ORAL
Qty: 90 TABLET | Refills: 3 | Status: SHIPPED | OUTPATIENT
Start: 2019-01-07 | End: 2019-05-13

## 2019-01-07 RX ORDER — LEVOTHYROXINE SODIUM 0.03 MG/1
TABLET ORAL
Qty: 90 TABLET | Refills: 3 | Status: SHIPPED | OUTPATIENT
Start: 2019-01-07 | End: 2019-05-13

## 2019-01-07 NOTE — TELEPHONE ENCOUNTER
pts son Neri Leslie called to inquire about mother, Bailey's medicaitions. Zaki Haynesgrayson states that she was told medications would be sent to the pharmacy after OV on 12/11/18. Thank you    Son called back to say that Zaki Murphygrayson is seeking BP medication.

## 2019-03-19 DIAGNOSIS — M15.9 PRIMARY OSTEOARTHRITIS INVOLVING MULTIPLE JOINTS: ICD-10-CM

## 2019-05-13 DIAGNOSIS — F41.1 GENERALIZED ANXIETY DISORDER: ICD-10-CM

## 2019-05-13 DIAGNOSIS — E03.9 ACQUIRED HYPOTHYROIDISM: ICD-10-CM

## 2019-05-13 DIAGNOSIS — K29.50 CHRONIC GASTRITIS WITHOUT BLEEDING, UNSPECIFIED GASTRITIS TYPE: ICD-10-CM

## 2019-05-13 DIAGNOSIS — G44.219 EPISODIC TENSION-TYPE HEADACHE, NOT INTRACTABLE: ICD-10-CM

## 2019-05-13 DIAGNOSIS — E78.2 MIXED HYPERLIPIDEMIA: ICD-10-CM

## 2019-05-13 DIAGNOSIS — M15.9 PRIMARY OSTEOARTHRITIS INVOLVING MULTIPLE JOINTS: ICD-10-CM

## 2019-05-13 DIAGNOSIS — M81.0 OSTEOPOROSIS, POST-MENOPAUSAL: ICD-10-CM

## 2019-05-13 RX ORDER — ATENOLOL 50 MG/1
50 TABLET ORAL
Qty: 90 TABLET | Refills: 3 | Status: SHIPPED | OUTPATIENT
Start: 2019-05-13 | End: 2020-09-11

## 2019-05-13 RX ORDER — SIMVASTATIN 40 MG
TABLET ORAL
Qty: 90 TABLET | Refills: 3 | Status: SHIPPED | OUTPATIENT
Start: 2019-05-13 | End: 2020-09-11

## 2019-05-13 RX ORDER — SERTRALINE HYDROCHLORIDE 100 MG/1
100 TABLET, FILM COATED ORAL
Qty: 90 TABLET | Refills: 3 | Status: SHIPPED | OUTPATIENT
Start: 2019-05-13 | End: 2020-10-19

## 2019-05-13 RX ORDER — LEVOTHYROXINE SODIUM 0.03 MG/1
TABLET ORAL
Qty: 90 TABLET | Refills: 3 | Status: SHIPPED | OUTPATIENT
Start: 2019-05-13 | End: 2020-09-11

## 2019-05-13 RX ORDER — RANITIDINE 150 MG/1
150 TABLET ORAL NIGHTLY
Qty: 90 TABLET | Refills: 1 | Status: SHIPPED | OUTPATIENT
Start: 2019-05-13 | End: 2019-05-16

## 2019-05-13 RX ORDER — ALENDRONATE SODIUM 35 MG/1
35 TABLET ORAL
Qty: 12 TABLET | Refills: 3 | Status: SHIPPED | OUTPATIENT
Start: 2019-05-13 | End: 2020-07-21

## 2019-05-13 NOTE — TELEPHONE ENCOUNTER
Pts daughter called requesting a refill for all of the pts meds. She will be leaving [facility] on Monday 5/20 for a month and a half.

## 2019-05-14 RX ORDER — ALPRAZOLAM 0.5 MG/1
TABLET ORAL
Qty: 60 TABLET | Refills: 4 | Status: SHIPPED | OUTPATIENT
Start: 2019-05-14 | End: 2019-11-12

## 2019-05-16 ENCOUNTER — OFFICE VISIT (OUTPATIENT)
Dept: INTERNAL MEDICINE CLINIC | Facility: CLINIC | Age: 74
End: 2019-05-16
Payer: MEDICARE

## 2019-05-16 VITALS
SYSTOLIC BLOOD PRESSURE: 124 MMHG | BODY MASS INDEX: 31.18 KG/M2 | HEART RATE: 67 BPM | RESPIRATION RATE: 16 BRPM | HEIGHT: 60.7 IN | DIASTOLIC BLOOD PRESSURE: 70 MMHG | OXYGEN SATURATION: 98 % | WEIGHT: 163 LBS

## 2019-05-16 DIAGNOSIS — M16.0 ARTHRITIS OF BOTH HIPS: Primary | ICD-10-CM

## 2019-05-16 DIAGNOSIS — Z96.653 HISTORY OF KNEE REPLACEMENT, TOTAL, BILATERAL: ICD-10-CM

## 2019-05-16 DIAGNOSIS — K29.50 CHRONIC GASTRITIS WITHOUT BLEEDING, UNSPECIFIED GASTRITIS TYPE: ICD-10-CM

## 2019-05-16 DIAGNOSIS — M15.9 PRIMARY OSTEOARTHRITIS INVOLVING MULTIPLE JOINTS: ICD-10-CM

## 2019-05-16 PROCEDURE — 99213 OFFICE O/P EST LOW 20 MIN: CPT | Performed by: FAMILY MEDICINE

## 2019-05-16 RX ORDER — RANITIDINE 150 MG/1
150 TABLET ORAL 2 TIMES DAILY
Qty: 180 TABLET | Refills: 1 | Status: SHIPPED | OUTPATIENT
Start: 2019-05-16 | End: 2019-11-15 | Stop reason: RX

## 2019-05-18 PROBLEM — Z96.653 HISTORY OF KNEE REPLACEMENT, TOTAL, BILATERAL: Status: ACTIVE | Noted: 2019-05-18

## 2019-05-18 PROBLEM — M16.0 ARTHRITIS OF BOTH HIPS: Status: ACTIVE | Noted: 2019-05-18

## 2019-05-18 NOTE — PROGRESS NOTES
CC:  Back Pain (Pt presents to clinic for c/o back and left hip pain x wks. )      Hx of CC:  CHRONIC (X YEARS) BUT GRADUALLY WORSENING LOW BACK AND BILATERAL HIP PAINS (L > R). WORSE WITH COLD DAMP WEATHER. H/O BILATERAL TKA'S.     Vitals:    05/16/19  1

## 2019-07-13 ENCOUNTER — HOSPITAL ENCOUNTER (OUTPATIENT)
Dept: GENERAL RADIOLOGY | Facility: HOSPITAL | Age: 74
Discharge: HOME | End: 2019-07-13
Attending: FAMILY MEDICINE
Payer: MEDICARE

## 2019-07-13 DIAGNOSIS — M16.0 ARTHRITIS OF BOTH HIPS: ICD-10-CM

## 2019-07-13 PROCEDURE — 73522 X-RAY EXAM HIPS BI 3-4 VIEWS: CPT | Performed by: FAMILY MEDICINE

## 2019-08-08 ENCOUNTER — OFFICE VISIT (OUTPATIENT)
Dept: INTERNAL MEDICINE CLINIC | Facility: CLINIC | Age: 74
End: 2019-08-08
Payer: MEDICARE

## 2019-08-08 VITALS
BODY MASS INDEX: 30.6 KG/M2 | WEIGHT: 160 LBS | OXYGEN SATURATION: 97 % | HEIGHT: 60.7 IN | HEART RATE: 66 BPM | DIASTOLIC BLOOD PRESSURE: 72 MMHG | SYSTOLIC BLOOD PRESSURE: 130 MMHG | RESPIRATION RATE: 17 BRPM

## 2019-08-08 DIAGNOSIS — G89.29 CHRONIC BILATERAL LOW BACK PAIN WITHOUT SCIATICA: ICD-10-CM

## 2019-08-08 DIAGNOSIS — E78.2 MIXED HYPERLIPIDEMIA: ICD-10-CM

## 2019-08-08 DIAGNOSIS — E03.9 ACQUIRED HYPOTHYROIDISM: ICD-10-CM

## 2019-08-08 DIAGNOSIS — H60.391 OTHER INFECTIVE ACUTE OTITIS EXTERNA OF RIGHT EAR: ICD-10-CM

## 2019-08-08 DIAGNOSIS — M54.50 CHRONIC BILATERAL LOW BACK PAIN WITHOUT SCIATICA: ICD-10-CM

## 2019-08-08 DIAGNOSIS — M16.0 ARTHRITIS OF BOTH HIPS: Primary | ICD-10-CM

## 2019-08-08 LAB
ALBUMIN SERPL-MCNC: 3.8 G/DL (ref 3.4–5)
ALBUMIN/GLOB SERPL: 1 {RATIO} (ref 1–2)
ALP LIVER SERPL-CCNC: 73 U/L (ref 55–142)
ALT SERPL-CCNC: 25 U/L (ref 13–56)
ANION GAP SERPL CALC-SCNC: 9 MMOL/L (ref 0–18)
AST SERPL-CCNC: 18 U/L (ref 15–37)
BILIRUB SERPL-MCNC: 0.3 MG/DL (ref 0.1–2)
BUN BLD-MCNC: 18 MG/DL (ref 7–18)
BUN/CREAT SERPL: 20.9 (ref 10–20)
CALCIUM BLD-MCNC: 9.5 MG/DL (ref 8.5–10.1)
CHLORIDE SERPL-SCNC: 109 MMOL/L (ref 98–112)
CHOLEST SMN-MCNC: 134 MG/DL (ref ?–200)
CO2 SERPL-SCNC: 25 MMOL/L (ref 21–32)
CREAT BLD-MCNC: 0.86 MG/DL (ref 0.55–1.02)
GLOBULIN PLAS-MCNC: 4 G/DL (ref 2.8–4.4)
GLUCOSE BLD-MCNC: 91 MG/DL (ref 70–99)
HDLC SERPL-MCNC: 44 MG/DL (ref 40–59)
LDLC SERPL CALC-MCNC: 69 MG/DL (ref ?–100)
M PROTEIN MFR SERPL ELPH: 7.8 G/DL (ref 6.4–8.2)
NONHDLC SERPL-MCNC: 90 MG/DL (ref ?–130)
OSMOLALITY SERPL CALC.SUM OF ELEC: 297 MOSM/KG (ref 275–295)
PATIENT FASTING: YES
PATIENT FASTING: YES
POTASSIUM SERPL-SCNC: 4 MMOL/L (ref 3.5–5.1)
SODIUM SERPL-SCNC: 143 MMOL/L (ref 136–145)
TRIGL SERPL-MCNC: 105 MG/DL (ref 30–149)
TSI SER-ACNC: 2.12 MIU/ML (ref 0.36–3.74)
VLDLC SERPL CALC-MCNC: 21 MG/DL (ref 0–30)

## 2019-08-08 PROCEDURE — 99214 OFFICE O/P EST MOD 30 MIN: CPT | Performed by: FAMILY MEDICINE

## 2019-08-08 PROCEDURE — 84443 ASSAY THYROID STIM HORMONE: CPT | Performed by: FAMILY MEDICINE

## 2019-08-08 PROCEDURE — 80053 COMPREHEN METABOLIC PANEL: CPT | Performed by: FAMILY MEDICINE

## 2019-08-08 PROCEDURE — 80061 LIPID PANEL: CPT | Performed by: FAMILY MEDICINE

## 2019-08-08 RX ORDER — NEOMYCIN SULFATE, POLYMYXIN B SULFATE, HYDROCORTISONE 3.5; 10000; 1 MG/ML; [USP'U]/ML; MG/ML
4 SOLUTION/ DROPS AURICULAR (OTIC) 4 TIMES DAILY
Qty: 10 ML | Refills: 0 | Status: SHIPPED | OUTPATIENT
Start: 2019-08-08 | End: 2021-03-09 | Stop reason: ALTCHOICE

## 2019-08-08 NOTE — PROGRESS NOTES
CC:  Test Results (Presents to clinic to review b/l hip x-rays.) and Ear Problem (Pain and discomfort. )      Hx of CC:  F/UP ON XR'S OF HIPS.   H/O CHRONIC BILATERAL HIP PAIN--WORSE WITH WALKING, ALSO SOME RIGHT MID BACK DISCOMFORT AS WELL (NON-RADIATING) Patient

## 2019-11-11 DIAGNOSIS — F41.1 GENERALIZED ANXIETY DISORDER: ICD-10-CM

## 2019-11-11 NOTE — TELEPHONE ENCOUNTER
Patient's daughter called.   The pharmacy is asking that a referral is needed for her mom's Alprazolam prescription,

## 2019-11-12 RX ORDER — ALPRAZOLAM 0.5 MG/1
TABLET ORAL
Qty: 60 TABLET | Refills: 2 | Status: SHIPPED | OUTPATIENT
Start: 2019-11-12 | End: 2019-12-07

## 2019-11-13 ENCOUNTER — OFFICE VISIT (OUTPATIENT)
Dept: NEUROLOGY | Facility: CLINIC | Age: 74
End: 2019-11-13
Payer: MEDICARE

## 2019-11-13 VITALS
HEART RATE: 66 BPM | DIASTOLIC BLOOD PRESSURE: 72 MMHG | WEIGHT: 160 LBS | BODY MASS INDEX: 30.6 KG/M2 | HEIGHT: 60.7 IN | SYSTOLIC BLOOD PRESSURE: 126 MMHG | RESPIRATION RATE: 17 BRPM

## 2019-11-13 DIAGNOSIS — M54.16 LUMBAR RADICULOPATHY, ACUTE: ICD-10-CM

## 2019-11-13 DIAGNOSIS — M25.551 BILATERAL HIP PAIN: ICD-10-CM

## 2019-11-13 DIAGNOSIS — R20.0 NUMBNESS IN FEET: ICD-10-CM

## 2019-11-13 DIAGNOSIS — M54.59 LUMBAR TRIGGER POINT SYNDROME: ICD-10-CM

## 2019-11-13 DIAGNOSIS — M25.552 BILATERAL HIP PAIN: ICD-10-CM

## 2019-11-13 DIAGNOSIS — S76.019A STRAIN OF GLUTEUS MEDIUS, UNSPECIFIED LATERALITY, INITIAL ENCOUNTER: ICD-10-CM

## 2019-11-13 DIAGNOSIS — M54.59 MECHANICAL LOW BACK PAIN: ICD-10-CM

## 2019-11-13 DIAGNOSIS — M47.816 LUMBAR SPONDYLOSIS: Primary | ICD-10-CM

## 2019-11-13 DIAGNOSIS — M47.816 FACET SYNDROME, LUMBAR: ICD-10-CM

## 2019-11-13 PROCEDURE — 99204 OFFICE O/P NEW MOD 45 MIN: CPT | Performed by: PHYSICAL MEDICINE & REHABILITATION

## 2019-11-13 RX ORDER — DICLOFENAC SODIUM 75 MG/1
75 TABLET, DELAYED RELEASE ORAL 2 TIMES DAILY
Qty: 60 TABLET | Refills: 1 | Status: SHIPPED | OUTPATIENT
Start: 2019-11-13 | End: 2019-12-07 | Stop reason: DRUGHIGH

## 2019-11-13 NOTE — H&P
[facility] [address]  [address] [facility] H&P    Requesting Physician: Mis Beck DO    Chief Complaint (Reason for Visit):  No chief complaint on file. History of Present Illness:   The patient is a 66year old right-handed Drug use: No      Sexual activity: Not on file      CURRENT MEDICATIONS:   Current Outpatient Medications   Medication Sig Dispense Refill   • Diclofenac Sodium 75 MG Oral Tab EC Take 1 tablet (75 mg total) by mouth 2 (two) times daily.  Take with food fo negative. Pertinent positives and negatives noted in the HPI. PHYSICAL EXAM:   /72   Pulse 66   Resp 17   Ht 60.7\"   Wt 160 lb (72.6 kg)   BMI 30.53 kg/m²     Body mass index is 30.53 kg/m².       General: No immediate distress  Head: Normocep Final   • BUN 08/08/2019 18  7 - 18 mg/dL Final   • Creatinine 08/08/2019 0.86  0.55 - 1.02 mg/dL Final   • BUN/CREA Ratio 08/08/2019 20.9* 10.0 - 20.0 Final   • Calcium, Total 08/08/2019 9.5  8.5 - 10.1 mg/dL Final   • Calculated Osmolality 08/08/2019 297 OTHER: Negative. Impression: CONCLUSION:   1. There is no acute appearing fracture or dislocation. Mild central narrowing of both hip joints. Minimal bony spurring at the inferior articular surfaces of the right and left femoral heads.

## 2019-11-13 NOTE — PROGRESS NOTES
Location of pain: jeff hip   Rate your pain: 7  Timeline of pain: years   Characterize the pain: shooting pain    Worse with: standing and more active  Better with: resting/sitting    Medications used: OTC pain meds   Previous Injections: none   Previous Im

## 2019-11-13 NOTE — PATIENT INSTRUCTIONS
1) Get Xr of the lumbar spine and pelvis on your way out today    2) Take Diclofenac 75 mg 1 tablet twice per day with food for the next two weeks and then as needed but no more than 2 tablets per day.  Do not take with any other NSAIDS (Ibuprofen, Advil, A

## 2019-11-15 ENCOUNTER — TELEPHONE (OUTPATIENT)
Dept: INTERNAL MEDICINE CLINIC | Facility: CLINIC | Age: 74
End: 2019-11-15

## 2019-11-15 DIAGNOSIS — K21.9 GASTROESOPHAGEAL REFLUX DISEASE, ESOPHAGITIS PRESENCE NOT SPECIFIED: Primary | ICD-10-CM

## 2019-11-15 RX ORDER — FAMOTIDINE 20 MG/1
20 TABLET ORAL 2 TIMES DAILY PRN
Qty: 180 TABLET | Refills: 1 | Status: SHIPPED | OUTPATIENT
Start: 2019-11-15 | End: 2019-12-07

## 2019-11-15 NOTE — TELEPHONE ENCOUNTER
Patient called says her pharmacy does not have the ranitidine medication, would like to know if Dr. Maudine Closs can prescribe a new medication and send it to her pharmacy [facility] in [address]

## 2019-11-16 ENCOUNTER — HOSPITAL ENCOUNTER (OUTPATIENT)
Dept: GENERAL RADIOLOGY | Facility: HOSPITAL | Age: 74
Discharge: HOME | End: 2019-11-16
Attending: PHYSICAL MEDICINE & REHABILITATION
Payer: MEDICARE

## 2019-11-16 DIAGNOSIS — M47.816 LUMBAR SPONDYLOSIS: ICD-10-CM

## 2019-11-16 DIAGNOSIS — R20.0 NUMBNESS IN FEET: ICD-10-CM

## 2019-11-16 DIAGNOSIS — M54.16 LUMBAR RADICULOPATHY, ACUTE: ICD-10-CM

## 2019-11-16 DIAGNOSIS — M54.59 MECHANICAL LOW BACK PAIN: ICD-10-CM

## 2019-11-16 DIAGNOSIS — M47.816 FACET SYNDROME, LUMBAR: ICD-10-CM

## 2019-11-16 DIAGNOSIS — M25.552 BILATERAL HIP PAIN: ICD-10-CM

## 2019-11-16 DIAGNOSIS — S76.019A STRAIN OF GLUTEUS MEDIUS, UNSPECIFIED LATERALITY, INITIAL ENCOUNTER: ICD-10-CM

## 2019-11-16 DIAGNOSIS — M25.551 BILATERAL HIP PAIN: ICD-10-CM

## 2019-11-16 DIAGNOSIS — M54.59 LUMBAR TRIGGER POINT SYNDROME: ICD-10-CM

## 2019-11-16 PROCEDURE — 73521 X-RAY EXAM HIPS BI 2 VIEWS: CPT | Performed by: PHYSICAL MEDICINE & REHABILITATION

## 2019-11-16 PROCEDURE — 72110 X-RAY EXAM L-2 SPINE 4/>VWS: CPT | Performed by: PHYSICAL MEDICINE & REHABILITATION

## 2019-12-07 ENCOUNTER — OFFICE VISIT (OUTPATIENT)
Dept: INTERNAL MEDICINE CLINIC | Facility: CLINIC | Age: 74
End: 2019-12-07
Payer: MEDICARE

## 2019-12-07 VITALS
HEIGHT: 60.5 IN | OXYGEN SATURATION: 98 % | BODY MASS INDEX: 30.41 KG/M2 | HEART RATE: 67 BPM | WEIGHT: 159 LBS | SYSTOLIC BLOOD PRESSURE: 132 MMHG | DIASTOLIC BLOOD PRESSURE: 88 MMHG

## 2019-12-07 DIAGNOSIS — Z96.653 HISTORY OF KNEE REPLACEMENT, TOTAL, BILATERAL: ICD-10-CM

## 2019-12-07 DIAGNOSIS — M15.9 PRIMARY OSTEOARTHRITIS INVOLVING MULTIPLE JOINTS: ICD-10-CM

## 2019-12-07 DIAGNOSIS — Z23 NEED FOR INFLUENZA VACCINATION: Primary | ICD-10-CM

## 2019-12-07 DIAGNOSIS — E78.2 MIXED HYPERLIPIDEMIA: ICD-10-CM

## 2019-12-07 DIAGNOSIS — F41.1 GENERALIZED ANXIETY DISORDER: ICD-10-CM

## 2019-12-07 DIAGNOSIS — E03.9 ACQUIRED HYPOTHYROIDISM: ICD-10-CM

## 2019-12-07 DIAGNOSIS — J30.2 SEASONAL ALLERGIES: ICD-10-CM

## 2019-12-07 PROCEDURE — 90662 IIV NO PRSV INCREASED AG IM: CPT | Performed by: FAMILY MEDICINE

## 2019-12-07 PROCEDURE — G0008 ADMIN INFLUENZA VIRUS VAC: HCPCS | Performed by: FAMILY MEDICINE

## 2019-12-07 PROCEDURE — 99214 OFFICE O/P EST MOD 30 MIN: CPT | Performed by: FAMILY MEDICINE

## 2019-12-07 RX ORDER — ALPRAZOLAM 0.5 MG/1
TABLET ORAL
Qty: 180 TABLET | Refills: 1 | Status: SHIPPED | OUTPATIENT
Start: 2019-12-07 | End: 2020-09-12

## 2019-12-07 RX ORDER — CETIRIZINE HYDROCHLORIDE 5 MG/1
5 TABLET ORAL DAILY
Qty: 90 TABLET | Refills: 1 | Status: SHIPPED | OUTPATIENT
Start: 2019-12-07 | End: 2021-03-09

## 2019-12-07 RX ORDER — FAMOTIDINE 20 MG/1
20 TABLET ORAL 2 TIMES DAILY PRN
Qty: 180 TABLET | Refills: 1 | Status: SHIPPED | OUTPATIENT
Start: 2019-12-07 | End: 2020-10-19

## 2019-12-07 NOTE — PROGRESS NOTES
CC:  Knee Pain (Pt presents to clinic for f/up on left knee pain-->improving with PT.)      Hx of CC:  S/P BILATERAL TKA'S, RIGHT KNEE PAIN AND FLEXIBILITY BETTER ON PT  F/UP ON MOOD/ANXIETY, LIPIDS, THYROID AS WELL.     Vitals:    12/07/19  0934   BP: 132/ Acquired hypothyroidism:  THERAPEUTIC LEVELS ON LEVOXYL    FLU VACC HIGH DOSE PRSV FREE  No orders of the defined types were placed in this encounter.

## 2020-01-09 ENCOUNTER — TELEPHONE (OUTPATIENT)
Dept: NEUROLOGY | Facility: CLINIC | Age: 75
End: 2020-01-09

## 2020-01-09 ENCOUNTER — OFFICE VISIT (OUTPATIENT)
Dept: NEUROLOGY | Facility: CLINIC | Age: 75
End: 2020-01-09
Payer: MEDICARE

## 2020-01-09 VITALS
RESPIRATION RATE: 17 BRPM | DIASTOLIC BLOOD PRESSURE: 66 MMHG | HEART RATE: 55 BPM | WEIGHT: 159 LBS | BODY MASS INDEX: 30.41 KG/M2 | SYSTOLIC BLOOD PRESSURE: 122 MMHG | HEIGHT: 60.5 IN

## 2020-01-09 DIAGNOSIS — M47.816 FACET SYNDROME, LUMBAR: ICD-10-CM

## 2020-01-09 DIAGNOSIS — M54.59 LUMBAR TRIGGER POINT SYNDROME: ICD-10-CM

## 2020-01-09 DIAGNOSIS — M70.61 GREATER TROCHANTERIC BURSITIS OF BOTH HIPS: ICD-10-CM

## 2020-01-09 DIAGNOSIS — M47.816 LUMBAR SPONDYLOSIS: Primary | ICD-10-CM

## 2020-01-09 DIAGNOSIS — M54.59 MECHANICAL LOW BACK PAIN: ICD-10-CM

## 2020-01-09 DIAGNOSIS — M25.552 BILATERAL HIP PAIN: ICD-10-CM

## 2020-01-09 DIAGNOSIS — M70.62 GREATER TROCHANTERIC BURSITIS OF BOTH HIPS: ICD-10-CM

## 2020-01-09 DIAGNOSIS — M54.16 LUMBAR RADICULOPATHY, ACUTE: ICD-10-CM

## 2020-01-09 DIAGNOSIS — M25.551 BILATERAL HIP PAIN: ICD-10-CM

## 2020-01-09 DIAGNOSIS — S76.019A STRAIN OF GLUTEUS MEDIUS, UNSPECIFIED LATERALITY, INITIAL ENCOUNTER: ICD-10-CM

## 2020-01-09 PROCEDURE — 99214 OFFICE O/P EST MOD 30 MIN: CPT | Performed by: PHYSICAL MEDICINE & REHABILITATION

## 2020-01-09 NOTE — PROGRESS NOTES
[address]  Progress Note    CHIEF COMPLAINT:  Patient presents with:  Hip Pain: LOV 11/13/19 Pt is returning for a PT follow up.  she states that she is feeling better after starting PT       History of Pres file      FAMILY HISTORY:   Family History   Problem Relation Age of Onset   • Diabetes Mother    • Breast Cancer Sister 72       CURRENT MEDICATIONS:   Current Outpatient Medications   Medication Sig Dispense Refill   • Diclofenac Sodium 50 MG Oral Tab EC reviewed and are negative. Pertinent positives and negatives noted in the HPI. PHYSICAL EXAM:   /66   Pulse 55   Resp 17   Ht 60.5\"   Wt 159 lb (72.1 kg)   BMI 30.54 kg/m²     Body mass index is 30.54 kg/m².       General: No immediate distres 7 - 18 mg/dL Final   • Creatinine 08/08/2019 0.86  0.55 - 1.02 mg/dL Final   • BUN/CREA Ratio 08/08/2019 20.9* 10.0 - 20.0 Final   • Calcium, Total 08/08/2019 9.5  8.5 - 10.1 mg/dL Final   • Calculated Osmolality 08/08/2019 297* 275 - 295 mOsm/kg Final   • the pubic symphysis. There is no acute fracture or dislocation. SOFT TISSUES: Negative. No visible soft tissue swelling. EFFUSION: None visible. OTHER: There are vascular calcifications. .                Impression: CONCLUSION:      Mild bilateral h Approved by (CST): Lucy Herrera MD on 11/16/2019 at 13:20              ASSESSMENT AND PLAN:  The patient is a 33-year-old female accompanied by her daughter who is coming in for follow-up of her bilateral low back pain and bilateral buttock pain.   For bi

## 2020-01-09 NOTE — TELEPHONE ENCOUNTER
Medicare online for insurance coverage of MRI SPINE LUMBAR (CPT=72148) . Insurance was verified and procedure is a cover benefit and does not require authorization. Will inform patient . Patient informed of approval can proceed to schedule appointment.

## 2020-01-09 NOTE — PATIENT INSTRUCTIONS
1) Continue with therapy. 2) My office will call you once the MRI is approved by your insurance.  You should then schedule the MRI and call my office again to make an appointment with me 2-3 days after your exam for review of your images and a further genoveva

## 2020-01-13 ENCOUNTER — MED REC SCAN ONLY (OUTPATIENT)
Dept: NEUROLOGY | Facility: CLINIC | Age: 75
End: 2020-01-13

## 2020-01-20 ENCOUNTER — HOSPITAL ENCOUNTER (OUTPATIENT)
Dept: MRI IMAGING | Age: 75
Discharge: HOME | End: 2020-01-20
Attending: PHYSICAL MEDICINE & REHABILITATION
Payer: MEDICARE

## 2020-01-20 DIAGNOSIS — M54.59 MECHANICAL LOW BACK PAIN: ICD-10-CM

## 2020-01-20 DIAGNOSIS — M70.61 GREATER TROCHANTERIC BURSITIS OF BOTH HIPS: ICD-10-CM

## 2020-01-20 DIAGNOSIS — M47.816 LUMBAR SPONDYLOSIS: ICD-10-CM

## 2020-01-20 DIAGNOSIS — S76.019A STRAIN OF GLUTEUS MEDIUS, UNSPECIFIED LATERALITY, INITIAL ENCOUNTER: ICD-10-CM

## 2020-01-20 DIAGNOSIS — M70.62 GREATER TROCHANTERIC BURSITIS OF BOTH HIPS: ICD-10-CM

## 2020-01-20 DIAGNOSIS — M25.551 BILATERAL HIP PAIN: ICD-10-CM

## 2020-01-20 DIAGNOSIS — M54.16 LUMBAR RADICULOPATHY, ACUTE: ICD-10-CM

## 2020-01-20 DIAGNOSIS — M25.552 BILATERAL HIP PAIN: ICD-10-CM

## 2020-01-20 DIAGNOSIS — M54.59 LUMBAR TRIGGER POINT SYNDROME: ICD-10-CM

## 2020-01-20 DIAGNOSIS — M47.816 FACET SYNDROME, LUMBAR: ICD-10-CM

## 2020-01-20 PROCEDURE — 72148 MRI LUMBAR SPINE W/O DYE: CPT | Performed by: PHYSICAL MEDICINE & REHABILITATION

## 2020-01-30 ENCOUNTER — OFFICE VISIT (OUTPATIENT)
Dept: NEUROLOGY | Facility: CLINIC | Age: 75
End: 2020-01-30
Payer: MEDICARE

## 2020-01-30 VITALS
DIASTOLIC BLOOD PRESSURE: 66 MMHG | WEIGHT: 157 LBS | HEIGHT: 62 IN | BODY MASS INDEX: 28.89 KG/M2 | SYSTOLIC BLOOD PRESSURE: 126 MMHG

## 2020-01-30 DIAGNOSIS — M47.816 FACET SYNDROME, LUMBAR: Primary | ICD-10-CM

## 2020-01-30 DIAGNOSIS — M48.061 LUMBAR FORAMINAL STENOSIS: ICD-10-CM

## 2020-01-30 DIAGNOSIS — M54.59 MECHANICAL LOW BACK PAIN: ICD-10-CM

## 2020-01-30 DIAGNOSIS — M70.61 GREATER TROCHANTERIC BURSITIS OF BOTH HIPS: ICD-10-CM

## 2020-01-30 DIAGNOSIS — M51.37 DDD (DEGENERATIVE DISC DISEASE), LUMBOSACRAL: ICD-10-CM

## 2020-01-30 DIAGNOSIS — S76.019A STRAIN OF GLUTEUS MEDIUS, UNSPECIFIED LATERALITY, INITIAL ENCOUNTER: ICD-10-CM

## 2020-01-30 DIAGNOSIS — M47.816 LUMBAR SPONDYLOSIS: ICD-10-CM

## 2020-01-30 DIAGNOSIS — M70.62 GREATER TROCHANTERIC BURSITIS OF BOTH HIPS: ICD-10-CM

## 2020-01-30 DIAGNOSIS — M51.26 BULGE OF LUMBAR DISC WITHOUT MYELOPATHY: ICD-10-CM

## 2020-01-30 PROBLEM — M51.36 BULGE OF LUMBAR DISC WITHOUT MYELOPATHY: Status: ACTIVE | Noted: 2020-01-30

## 2020-01-30 PROCEDURE — 99214 OFFICE O/P EST MOD 30 MIN: CPT | Performed by: PHYSICAL MEDICINE & REHABILITATION

## 2020-01-30 NOTE — PROGRESS NOTES
[address]  Progress Note    CHIEF COMPLAINT:  Patient presents with:  Low Back Pain: Patient presents to follow up for low back. LOV 01/09/19 She reports that her back is 50% beter.        History of Present 180 tablet 0   • ALPRAZolam 0.5 MG Oral Tab TAKE ONE TABLET BY MOUTH TWICE DAILY AS NEEDED 180 tablet 1   • famoTIDine 20 MG Oral Tab Take 1 tablet (20 mg total) by mouth 2 (two) times daily as needed for Heartburn.  180 tablet 1   • Cetirizine HCl 5 MG Ora Extra-occular movements intact. Ears: No auricular hematoma or deformities  Mouth: No lesions or ulcerations  Heart: peripheral pulses intact. Normal capillary refill.    Lungs: Non-labored respirations  Abdomen: No abdominal guarding  Extremities: No low mOsm/kg Final   • GFR, Non- 08/08/2019 66  >=60 Final   • GFR, -American 08/08/2019 76  >=60 Final   • ALT 08/08/2019 25  13 - 56 U/L Final   • AST 08/08/2019 18  15 - 37 U/L Final   • Alkaline Phosphatase 08/08/2019 73  55 - 142 U/L segment. The distal cord and nerve roots have normal caliber, contour, and signal intensity. PARASPINAL AREA: No visible mass. OTHER: There are subcentimeter T2 hyperintense foci in the kidneys, perhaps reflecting cysts.      LUMBAR DISC LEVELS:  L1-L2: L3-L4 and L4-L5 levels. 2. No acute osseous injury of the lumbar spine is appreciated. 3. Probable tiny renal cysts, not fully delineated or characterized. 4. Lesser incidental findings as above.            Dictated by (CST): Lance Dandy, M

## 2020-01-30 NOTE — PATIENT INSTRUCTIONS
1) My office will call you to schedule the BILATERAL L3-L4, L4-L5, and L5-S1 facet joint injections under local anesthesia once the procedure is approved by your insurance carrier. 2) Follow up in 2 weeks after injections.

## 2020-02-04 ENCOUNTER — MED REC SCAN ONLY (OUTPATIENT)
Dept: NEUROLOGY | Facility: CLINIC | Age: 75
End: 2020-02-04

## 2020-03-09 ENCOUNTER — TELEPHONE (OUTPATIENT)
Dept: NEUROLOGY | Facility: CLINIC | Age: 75
End: 2020-03-09

## 2020-05-27 DIAGNOSIS — M15.9 PRIMARY OSTEOARTHRITIS INVOLVING MULTIPLE JOINTS: ICD-10-CM

## 2020-05-28 ENCOUNTER — TELEPHONE (OUTPATIENT)
Dept: INTERNAL MEDICINE CLINIC | Facility: CLINIC | Age: 75
End: 2020-05-28

## 2020-05-29 RX ORDER — MISOPROSTOL 200 UG/1
200 TABLET ORAL 2 TIMES DAILY PRN
Qty: 180 TABLET | Refills: 0 | Status: SHIPPED | OUTPATIENT
Start: 2020-05-29 | End: 2020-10-19

## 2020-07-10 ENCOUNTER — TELEPHONE (OUTPATIENT)
Dept: INTERNAL MEDICINE CLINIC | Facility: CLINIC | Age: 75
End: 2020-07-10

## 2020-07-10 NOTE — TELEPHONE ENCOUNTER
Being discharged from hospital today, 7/10/20. Admitted on July 3rd. Was in for Fall with several Rib Fx. Had chest tube in for several days. Covid Negative. Going home with MELANIA. ([address] [facility] office)  [phone].

## 2020-07-15 NOTE — TELEPHONE ENCOUNTER
Patient's daughter called to schedule a hospital follow-up appointment for her mother that was released from the hospital on 7/10 for broken ribs.     They had an appointment they canceled tomorrow for her annual physical.  That appointment is now scheduled

## 2020-07-21 ENCOUNTER — OFFICE VISIT (OUTPATIENT)
Dept: INTERNAL MEDICINE CLINIC | Facility: CLINIC | Age: 75
End: 2020-07-21
Payer: MEDICARE

## 2020-07-21 VITALS
OXYGEN SATURATION: 98 % | HEART RATE: 73 BPM | HEIGHT: 60.5 IN | DIASTOLIC BLOOD PRESSURE: 80 MMHG | SYSTOLIC BLOOD PRESSURE: 140 MMHG | BODY MASS INDEX: 29.45 KG/M2 | RESPIRATION RATE: 18 BRPM | WEIGHT: 154 LBS

## 2020-07-21 DIAGNOSIS — D64.89 ANEMIA DUE TO OTHER CAUSE, NOT CLASSIFIED: ICD-10-CM

## 2020-07-21 DIAGNOSIS — M81.0 OSTEOPOROSIS, POST-MENOPAUSAL: ICD-10-CM

## 2020-07-21 DIAGNOSIS — Z09 EXAMINATION, FOLLOW UP FOR FRACTURE: Primary | ICD-10-CM

## 2020-07-21 DIAGNOSIS — S22.41XD CLOSED FRACTURE OF MULTIPLE RIBS OF RIGHT SIDE WITH ROUTINE HEALING, SUBSEQUENT ENCOUNTER: ICD-10-CM

## 2020-07-21 LAB
CUVETTE LOT #: ABNORMAL NUMERIC
HEMOGLOBIN: 11.4 G/DL (ref 12–15)

## 2020-07-21 PROCEDURE — 85018 HEMOGLOBIN: CPT | Performed by: FAMILY MEDICINE

## 2020-07-21 PROCEDURE — 1111F DSCHRG MED/CURRENT MED MERGE: CPT | Performed by: FAMILY MEDICINE

## 2020-07-21 PROCEDURE — 99214 OFFICE O/P EST MOD 30 MIN: CPT | Performed by: FAMILY MEDICINE

## 2020-07-21 RX ORDER — HYDROCODONE BITARTRATE AND ACETAMINOPHEN 5; 325 MG/1; MG/1
1 TABLET ORAL
COMMUNITY
Start: 2020-07-10 | End: 2020-07-21

## 2020-07-21 RX ORDER — HYDROCODONE BITARTRATE AND ACETAMINOPHEN 5; 325 MG/1; MG/1
1 TABLET ORAL EVERY 8 HOURS PRN
Qty: 30 TABLET | Refills: 0 | Status: SHIPPED | OUTPATIENT
Start: 2020-07-21 | End: 2020-10-19 | Stop reason: ALTCHOICE

## 2020-07-21 RX ORDER — ALENDRONATE SODIUM 35 MG/1
35 TABLET ORAL
Qty: 12 TABLET | Refills: 3 | Status: SHIPPED | OUTPATIENT
Start: 2020-07-21 | End: 2020-10-19

## 2020-07-21 RX ORDER — FERROUS SULFATE 325(65) MG
325 TABLET ORAL
Qty: 90 TABLET | Refills: 0 | Status: SHIPPED | OUTPATIENT
Start: 2020-07-21 | End: 2021-03-09

## 2020-07-22 PROBLEM — F32.A DEPRESSION: Status: ACTIVE | Noted: 2020-07-07

## 2020-07-22 PROBLEM — I10 ESSENTIAL (PRIMARY) HYPERTENSION: Status: ACTIVE | Noted: 2020-07-07

## 2020-07-22 PROBLEM — S22.41XA FRACTURE OF MULTIPLE RIBS OF RIGHT SIDE: Status: ACTIVE | Noted: 2020-07-04

## 2020-07-22 NOTE — PROGRESS NOTES
CC:  Hospital F/U (pt in for hospital f/up )      Hx of CC:  S/P FALL DOWN SHORT FLIGHT OF STAIRS 3 WEEKS AGO, BROKE MULTIPLE RIBS (R), HAD SURGERY AND [address] X 8 DAYS. HERE FOR RECHECK. PAIN BETTER.     Vitals:    07/21/20  1341   BP: 140/80   P 0    None  Orders Placed This Encounter      Hemoglobin

## 2020-09-10 DIAGNOSIS — E03.9 ACQUIRED HYPOTHYROIDISM: ICD-10-CM

## 2020-09-10 DIAGNOSIS — E78.2 MIXED HYPERLIPIDEMIA: ICD-10-CM

## 2020-09-10 DIAGNOSIS — G44.219 EPISODIC TENSION-TYPE HEADACHE, NOT INTRACTABLE: ICD-10-CM

## 2020-09-10 DIAGNOSIS — F41.1 GENERALIZED ANXIETY DISORDER: ICD-10-CM

## 2020-09-11 RX ORDER — LEVOTHYROXINE SODIUM 0.03 MG/1
TABLET ORAL
Qty: 90 TABLET | Refills: 0 | Status: SHIPPED | OUTPATIENT
Start: 2020-09-11 | End: 2020-10-19

## 2020-09-11 RX ORDER — ATENOLOL 50 MG/1
TABLET ORAL
Qty: 90 TABLET | Refills: 0 | Status: SHIPPED | OUTPATIENT
Start: 2020-09-11 | End: 2020-10-19

## 2020-09-11 RX ORDER — SIMVASTATIN 40 MG
TABLET ORAL
Qty: 90 TABLET | Refills: 0 | Status: SHIPPED | OUTPATIENT
Start: 2020-09-11 | End: 2020-10-19

## 2020-09-12 RX ORDER — ALPRAZOLAM 0.5 MG/1
TABLET ORAL
Qty: 60 TABLET | Refills: 0 | Status: SHIPPED | OUTPATIENT
Start: 2020-09-12 | End: 2020-10-19

## 2020-10-19 ENCOUNTER — OFFICE VISIT (OUTPATIENT)
Dept: INTERNAL MEDICINE CLINIC | Facility: CLINIC | Age: 75
End: 2020-10-19
Payer: MEDICARE

## 2020-10-19 VITALS
OXYGEN SATURATION: 97 % | BODY MASS INDEX: 30.6 KG/M2 | DIASTOLIC BLOOD PRESSURE: 88 MMHG | WEIGHT: 160 LBS | SYSTOLIC BLOOD PRESSURE: 142 MMHG | HEIGHT: 60.5 IN | RESPIRATION RATE: 17 BRPM | HEART RATE: 65 BPM

## 2020-10-19 DIAGNOSIS — H52.13 MYOPIA OF BOTH EYES: ICD-10-CM

## 2020-10-19 DIAGNOSIS — E03.9 ACQUIRED HYPOTHYROIDISM: ICD-10-CM

## 2020-10-19 DIAGNOSIS — M15.9 PRIMARY OSTEOARTHRITIS INVOLVING MULTIPLE JOINTS: ICD-10-CM

## 2020-10-19 DIAGNOSIS — K21.9 GASTROESOPHAGEAL REFLUX DISEASE, UNSPECIFIED WHETHER ESOPHAGITIS PRESENT: ICD-10-CM

## 2020-10-19 DIAGNOSIS — Z12.31 ENCOUNTER FOR SCREENING MAMMOGRAM FOR MALIGNANT NEOPLASM OF BREAST: ICD-10-CM

## 2020-10-19 DIAGNOSIS — F41.1 GENERALIZED ANXIETY DISORDER: ICD-10-CM

## 2020-10-19 DIAGNOSIS — E78.2 MIXED HYPERLIPIDEMIA: ICD-10-CM

## 2020-10-19 DIAGNOSIS — R30.0 DYSURIA: ICD-10-CM

## 2020-10-19 DIAGNOSIS — G44.219 EPISODIC TENSION-TYPE HEADACHE, NOT INTRACTABLE: ICD-10-CM

## 2020-10-19 DIAGNOSIS — Z23 NEED FOR INFLUENZA VACCINATION: ICD-10-CM

## 2020-10-19 DIAGNOSIS — M81.0 OSTEOPOROSIS, POST-MENOPAUSAL: ICD-10-CM

## 2020-10-19 DIAGNOSIS — Z00.00 ENCOUNTER FOR MEDICARE ANNUAL WELLNESS EXAM: Primary | ICD-10-CM

## 2020-10-19 PROCEDURE — 80053 COMPREHEN METABOLIC PANEL: CPT | Performed by: FAMILY MEDICINE

## 2020-10-19 PROCEDURE — G0008 ADMIN INFLUENZA VIRUS VAC: HCPCS | Performed by: FAMILY MEDICINE

## 2020-10-19 PROCEDURE — G0439 PPPS, SUBSEQ VISIT: HCPCS | Performed by: FAMILY MEDICINE

## 2020-10-19 PROCEDURE — 90662 IIV NO PRSV INCREASED AG IM: CPT | Performed by: FAMILY MEDICINE

## 2020-10-19 PROCEDURE — 84443 ASSAY THYROID STIM HORMONE: CPT | Performed by: FAMILY MEDICINE

## 2020-10-19 PROCEDURE — 81003 URINALYSIS AUTO W/O SCOPE: CPT | Performed by: FAMILY MEDICINE

## 2020-10-19 PROCEDURE — 84439 ASSAY OF FREE THYROXINE: CPT | Performed by: FAMILY MEDICINE

## 2020-10-19 PROCEDURE — 80061 LIPID PANEL: CPT | Performed by: FAMILY MEDICINE

## 2020-10-19 RX ORDER — FAMOTIDINE 20 MG/1
20 TABLET ORAL 2 TIMES DAILY PRN
Qty: 180 TABLET | Refills: 1 | Status: SHIPPED | OUTPATIENT
Start: 2020-10-19 | End: 2021-03-09

## 2020-10-19 RX ORDER — ALPRAZOLAM 0.5 MG/1
TABLET ORAL
Qty: 180 TABLET | Refills: 0 | Status: SHIPPED | OUTPATIENT
Start: 2020-10-19 | End: 2020-10-22

## 2020-10-19 RX ORDER — MISOPROSTOL 200 UG/1
200 TABLET ORAL 2 TIMES DAILY PRN
Qty: 180 TABLET | Refills: 0 | Status: SHIPPED | OUTPATIENT
Start: 2020-10-19 | End: 2021-03-09 | Stop reason: DRUGHIGH

## 2020-10-19 RX ORDER — LEVOTHYROXINE SODIUM 0.03 MG/1
25 TABLET ORAL
Qty: 90 TABLET | Refills: 1 | Status: SHIPPED | OUTPATIENT
Start: 2020-10-19 | End: 2021-03-15

## 2020-10-19 RX ORDER — SERTRALINE HYDROCHLORIDE 100 MG/1
100 TABLET, FILM COATED ORAL
Qty: 90 TABLET | Refills: 1 | Status: SHIPPED | OUTPATIENT
Start: 2020-10-19 | End: 2021-06-15

## 2020-10-19 RX ORDER — ATENOLOL 50 MG/1
50 TABLET ORAL DAILY
Qty: 90 TABLET | Refills: 1 | Status: SHIPPED | OUTPATIENT
Start: 2020-10-19 | End: 2021-03-09

## 2020-10-19 RX ORDER — ALENDRONATE SODIUM 35 MG/1
35 TABLET ORAL
Qty: 12 TABLET | Refills: 3 | Status: SHIPPED | OUTPATIENT
Start: 2020-10-19

## 2020-10-19 RX ORDER — SIMVASTATIN 40 MG
40 TABLET ORAL EVERY EVENING
Qty: 90 TABLET | Refills: 1 | Status: SHIPPED | OUTPATIENT
Start: 2020-10-19 | End: 2021-03-09

## 2020-10-19 NOTE — PROGRESS NOTES
HPI:   Isael Arellano is a 66year old female who presents for a Medicare Annual Wellness visit.     Patient Active Problem List:     Acquired hypothyroidism     Mixed hyperlipidemia     Generalized anxiety disorder     Primary osteoarthritis involving mul as prescribed: Able without help    Are you able to afford your medications?: No    Hearing Problems?: Yes     Functional Status     Hearing Problems?: Yes    Vision Problems? : No    Difficulty walking?: No    Difficulty dressing or bathing?: No    Proble tablet (50 mg total) by mouth daily.  90 tablet 1   • ALPRAZolam 0.5 MG Oral Tab TOME SMITHA CAPSULA O PASTILLA POR LA BOCA DOS VECES AL GIDEON KRISSY SEA NECESARIO 180 tablet 0   • Ferrous Sulfate 325 (65 Fe) MG Oral Tab Take 1 tablet (325 mg total) by mouth daily sinus pain or ST  LUNGS: denies shortness of breath with exertion  CARDIOVASCULAR: denies chest pain on exertion  GI: denies abdominal pain, denies heartburn  : denies dysuria, vaginal discharge or itching, no complaint of urinary incontinence.   HAD SOME Diagnoses and all orders for this visit:    Encounter for Medicare annual wellness exam  -     COMP METABOLIC PANEL (14)  -     CBC, PLATELET; NO DIFFERENTIAL;  Future    Mixed hyperlipidemia:  CONTROLLED ON STATIN  -     COMP METABOLIC PANEL (14)  - or Procedure External Lab or Procedure   Diabetes Screening      HbgA1C   Annually No results found for: A1C No flowsheet data found.     Fasting Blood Sugar (FSB)Annually No results found for: GLUCOSE    Cardiovascular Disease Screening     LDL Annually LD Lab or Procedure   Annual Monitoring of Persistent     Medications (ACE/ARB, digoxin diuretics, anticonvulsants.)    Potassium  Annually Potassium (mmol/L)   Date Value   08/08/2019 4.0     POTASSIUM (P) (mmol/L)   Date Value   04/18/2016 4.1    No flowshe this or any previous visit. Hepatitis B If at Risk 3 scheduled doses No orders found for this or any previous visit.          SUGGESTED VACCINATIONS - Influenza, Pneumococcal, Zoster, Tetanus     Immunization History  Administered            Date(s) Admin

## 2020-10-20 ENCOUNTER — TELEPHONE (OUTPATIENT)
Dept: INTERNAL MEDICINE CLINIC | Facility: CLINIC | Age: 75
End: 2020-10-20

## 2020-10-20 RX ORDER — MISOPROSTOL 100 UG/1
100 TABLET ORAL 2 TIMES DAILY
Qty: 180 TABLET | Refills: 1 | Status: SHIPPED | OUTPATIENT
Start: 2020-10-20 | End: 2021-03-09

## 2020-10-20 NOTE — TELEPHONE ENCOUNTER
[place] [facility] called     They do not have the 200 mcg of the misoprostol    Please send new rx for 100 mcg.

## 2020-10-22 ENCOUNTER — TELEPHONE (OUTPATIENT)
Dept: INTERNAL MEDICINE CLINIC | Facility: CLINIC | Age: 75
End: 2020-10-22

## 2020-10-22 NOTE — TELEPHONE ENCOUNTER
Pharmacy says they need Pt's alprazolam instruction in [address] not [address] Please send english version.

## 2021-01-19 ENCOUNTER — PATIENT OUTREACH (OUTPATIENT)
Dept: CASE MANAGEMENT | Age: 76
End: 2021-01-19

## 2021-01-19 NOTE — PROGRESS NOTES
I (Healthcare ), attempted to contact patient to introduce them to the Chronic Care Management Program. The patient's daughter usually interprets for the patient in [address] [facility]. Requesting to have a CCM can speak Japanese.  I informed patient's daughter mery

## 2021-03-04 DIAGNOSIS — Z23 NEED FOR VACCINATION: ICD-10-CM

## 2021-03-09 ENCOUNTER — OFFICE VISIT (OUTPATIENT)
Dept: FAMILY MEDICINE CLINIC | Facility: CLINIC | Age: 76
End: 2021-03-09
Payer: MEDICARE

## 2021-03-09 VITALS
BODY MASS INDEX: 30.41 KG/M2 | HEART RATE: 69 BPM | WEIGHT: 159 LBS | SYSTOLIC BLOOD PRESSURE: 124 MMHG | OXYGEN SATURATION: 96 % | DIASTOLIC BLOOD PRESSURE: 72 MMHG | HEIGHT: 60.5 IN

## 2021-03-09 DIAGNOSIS — E03.9 ACQUIRED HYPOTHYROIDISM: Primary | ICD-10-CM

## 2021-03-09 DIAGNOSIS — M15.9 PRIMARY OSTEOARTHRITIS INVOLVING MULTIPLE JOINTS: ICD-10-CM

## 2021-03-09 DIAGNOSIS — F41.1 GENERALIZED ANXIETY DISORDER: ICD-10-CM

## 2021-03-09 DIAGNOSIS — G44.219 EPISODIC TENSION-TYPE HEADACHE, NOT INTRACTABLE: ICD-10-CM

## 2021-03-09 DIAGNOSIS — Z00.00 HEALTHCARE MAINTENANCE: ICD-10-CM

## 2021-03-09 DIAGNOSIS — K21.9 GASTROESOPHAGEAL REFLUX DISEASE, UNSPECIFIED WHETHER ESOPHAGITIS PRESENT: ICD-10-CM

## 2021-03-09 DIAGNOSIS — E78.2 MIXED HYPERLIPIDEMIA: ICD-10-CM

## 2021-03-09 LAB
BASOPHILS # BLD AUTO: 0.05 X10(3) UL (ref 0–0.2)
BASOPHILS NFR BLD AUTO: 0.7 %
DEPRECATED RDW RBC AUTO: 46.8 FL (ref 35.1–46.3)
EOSINOPHIL # BLD AUTO: 0.21 X10(3) UL (ref 0–0.7)
EOSINOPHIL NFR BLD AUTO: 3 %
ERYTHROCYTE [DISTWIDTH] IN BLOOD BY AUTOMATED COUNT: 13.2 % (ref 11–15)
HCT VFR BLD AUTO: 41.3 %
HGB BLD-MCNC: 13.1 G/DL
IMM GRANULOCYTES # BLD AUTO: 0.02 X10(3) UL (ref 0–1)
IMM GRANULOCYTES NFR BLD: 0.3 %
LYMPHOCYTES # BLD AUTO: 2.07 X10(3) UL (ref 1–4)
LYMPHOCYTES NFR BLD AUTO: 29.4 %
MCH RBC QN AUTO: 30.6 PG (ref 26–34)
MCHC RBC AUTO-ENTMCNC: 31.7 G/DL (ref 31–37)
MCV RBC AUTO: 96.5 FL
MONOCYTES # BLD AUTO: 0.71 X10(3) UL (ref 0.1–1)
MONOCYTES NFR BLD AUTO: 10.1 %
NEUTROPHILS # BLD AUTO: 3.97 X10 (3) UL (ref 1.5–7.7)
NEUTROPHILS # BLD AUTO: 3.97 X10(3) UL (ref 1.5–7.7)
NEUTROPHILS NFR BLD AUTO: 56.5 %
PLATELET # BLD AUTO: 166 10(3)UL (ref 150–450)
RBC # BLD AUTO: 4.28 X10(6)UL
TSI SER-ACNC: 3.64 MIU/ML (ref 0.36–3.74)
WBC # BLD AUTO: 7 X10(3) UL (ref 4–11)

## 2021-03-09 PROCEDURE — 84443 ASSAY THYROID STIM HORMONE: CPT | Performed by: FAMILY MEDICINE

## 2021-03-09 PROCEDURE — 99215 OFFICE O/P EST HI 40 MIN: CPT | Performed by: FAMILY MEDICINE

## 2021-03-09 PROCEDURE — 3078F DIAST BP <80 MM HG: CPT | Performed by: FAMILY MEDICINE

## 2021-03-09 PROCEDURE — 3074F SYST BP LT 130 MM HG: CPT | Performed by: FAMILY MEDICINE

## 2021-03-09 PROCEDURE — 85025 COMPLETE CBC W/AUTO DIFF WBC: CPT | Performed by: FAMILY MEDICINE

## 2021-03-09 PROCEDURE — 3008F BODY MASS INDEX DOCD: CPT | Performed by: FAMILY MEDICINE

## 2021-03-09 RX ORDER — FAMOTIDINE 20 MG/1
20 TABLET ORAL 2 TIMES DAILY PRN
Qty: 180 TABLET | Refills: 1 | Status: SHIPPED | OUTPATIENT
Start: 2021-03-09 | End: 2021-06-15 | Stop reason: ALTCHOICE

## 2021-03-09 RX ORDER — ATENOLOL 50 MG/1
50 TABLET ORAL DAILY
Qty: 90 TABLET | Refills: 1 | Status: SHIPPED | OUTPATIENT
Start: 2021-03-09 | End: 2021-08-23

## 2021-03-09 RX ORDER — ALPRAZOLAM 0.5 MG/1
0.5 TABLET ORAL 2 TIMES DAILY PRN
Qty: 60 TABLET | Refills: 2 | Status: SHIPPED | OUTPATIENT
Start: 2021-03-09 | End: 2021-06-15

## 2021-03-09 RX ORDER — SIMVASTATIN 40 MG
40 TABLET ORAL EVERY EVENING
Qty: 90 TABLET | Refills: 1 | Status: SHIPPED | OUTPATIENT
Start: 2021-03-09 | End: 2021-08-23

## 2021-03-15 ENCOUNTER — TELEPHONE (OUTPATIENT)
Dept: FAMILY MEDICINE CLINIC | Facility: CLINIC | Age: 76
End: 2021-03-15

## 2021-03-15 RX ORDER — LEVOTHYROXINE SODIUM 0.03 MG/1
25 TABLET ORAL
Qty: 90 TABLET | Refills: 1 | Status: SHIPPED | OUTPATIENT
Start: 2021-03-15 | End: 2021-08-23

## 2021-03-15 NOTE — TELEPHONE ENCOUNTER
----- Message from Sue Martinez MD sent at 3/15/2021  2:58 AM CDT -----  Please let her know her thyroid is controlled.  CBC wnl

## 2021-04-01 NOTE — PROGRESS NOTES
HPI:   Patient presents with:  [address] is a 66year old female presenting for:  THryoid/HLD-. asymptomatc    Migraines-> stable    Anxiety-> needs xanax refill    OA-> stable on diclofenac.  Has been on misoprostol for gastric pp 08:44 AM    TSH 3.640 03/09/2021 02:01 PM    T4F 1.1 10/19/2020 08:44 AM          Medications:  Current Outpatient Medications   Medication Sig Dispense Refill   • Levothyroxine Sodium 25 MCG Oral Tab Take 1 tablet (25 mcg total) by mouth every morning bef SYSTEMS:   Review of Systems   Constitutional: Negative for fatigue and fever. Respiratory: Negative for cough and shortness of breath. Cardiovascular: Negative for chest pain, palpitations and leg swelling.    Gastrointestinal: Negative for abdominal PLATELET    Generalized anxiety disorder  -     ALPRAZolam 0.5 MG Oral Tab; Take 1 tablet (0.5 mg total) by mouth 2 (two) times daily as needed for Sleep or Anxiety. Episodic tension-type headache, not intractable  -     atenolol 50 MG Oral Tab;  Take 1 daily.   • famoTIDine 20 MG Oral Tab 180 tablet 1     Sig: Take 1 tablet (20 mg total) by mouth 2 (two) times daily as needed for Heartburn. • simvastatin 40 MG Oral Tab 90 tablet 1     Sig: Take 1 tablet (40 mg total) by mouth every evening.    • Diclofe

## 2021-06-15 ENCOUNTER — OFFICE VISIT (OUTPATIENT)
Dept: FAMILY MEDICINE CLINIC | Facility: CLINIC | Age: 76
End: 2021-06-15
Payer: MEDICARE

## 2021-06-15 VITALS
OXYGEN SATURATION: 96 % | SYSTOLIC BLOOD PRESSURE: 132 MMHG | HEIGHT: 60.5 IN | HEART RATE: 68 BPM | WEIGHT: 159 LBS | BODY MASS INDEX: 30.41 KG/M2 | DIASTOLIC BLOOD PRESSURE: 84 MMHG

## 2021-06-15 DIAGNOSIS — K29.30 CHRONIC SUPERFICIAL GASTRITIS WITHOUT BLEEDING: ICD-10-CM

## 2021-06-15 DIAGNOSIS — I10 ESSENTIAL HYPERTENSION: Primary | ICD-10-CM

## 2021-06-15 DIAGNOSIS — F41.1 GENERALIZED ANXIETY DISORDER: ICD-10-CM

## 2021-06-15 PROCEDURE — 99215 OFFICE O/P EST HI 40 MIN: CPT | Performed by: FAMILY MEDICINE

## 2021-06-15 RX ORDER — LISINOPRIL 5 MG/1
5 TABLET ORAL DAILY
Qty: 90 TABLET | Refills: 0 | Status: SHIPPED | OUTPATIENT
Start: 2021-06-15

## 2021-06-15 RX ORDER — SERTRALINE HYDROCHLORIDE 100 MG/1
100 TABLET, FILM COATED ORAL
Qty: 90 TABLET | Refills: 1 | Status: SHIPPED | OUTPATIENT
Start: 2021-06-15

## 2021-06-15 RX ORDER — OMEPRAZOLE 20 MG/1
20 CAPSULE, DELAYED RELEASE ORAL
Qty: 90 CAPSULE | Refills: 0 | Status: SHIPPED | OUTPATIENT
Start: 2021-06-15

## 2021-06-15 RX ORDER — ALPRAZOLAM 0.5 MG/1
0.5 TABLET ORAL 2 TIMES DAILY PRN
Qty: 60 TABLET | Refills: 2 | Status: SHIPPED | OUTPATIENT
Start: 2021-06-15 | End: 2021-11-30

## 2021-06-15 NOTE — PROGRESS NOTES
HPI:   Patient presents with:  Nausea: c/c nausea x2 months   Blood Pressure: per patient b/p readings have been like in the 150s/100s on and off x2 months      Natividad Junior is a 66year old female presenting for:    Some indigestion, bloating, gas  2mo ALB 4.0 10/19/2020 08:44 AM    TP 8.0 10/19/2020 08:44 AM    ALKPHO 79 10/19/2020 08:44 AM    AST 19 10/19/2020 08:44 AM    ALT 22 10/19/2020 08:44 AM    BILT 0.3 10/19/2020 08:44 AM    TSH 3.640 03/09/2021 02:01 PM    T4F 1.1 10/19/2020 08:44 AM History    Tobacco Use      Smoking status: Never Smoker      Smokeless tobacco: Never Used    Alcohol use: No    Drug use: No     Family History:  Family History   Problem Relation Age of Onset   • Diabetes Mother    • Breast Cancer Sister 72          REV for:    Diagnoses and all orders for this visit:    Essential hypertension  -     lisinopril 5 MG Oral Tab; Take 1 tablet (5 mg total) by mouth daily.   -start lisinopril  -watch salt intake, regular exercise, DASH/heart healthy eating  -monitor home BP reg (two) times daily as needed for Sleep or Anxiety. • Sertraline HCl 100 MG Oral Tab 90 tablet 1     Sig: Take 1 tablet (100 mg total) by mouth once daily. No orders of the defined types were placed in this encounter.       Imaging & Consults:  None

## 2021-07-27 ENCOUNTER — TELEPHONE (OUTPATIENT)
Dept: FAMILY MEDICINE CLINIC | Facility: CLINIC | Age: 76
End: 2021-07-27

## 2021-08-21 DIAGNOSIS — E03.9 ACQUIRED HYPOTHYROIDISM: ICD-10-CM

## 2021-08-21 DIAGNOSIS — G44.219 EPISODIC TENSION-TYPE HEADACHE, NOT INTRACTABLE: ICD-10-CM

## 2021-08-21 DIAGNOSIS — E78.2 MIXED HYPERLIPIDEMIA: ICD-10-CM

## 2021-08-23 RX ORDER — ATENOLOL 50 MG/1
50 TABLET ORAL DAILY
Qty: 90 TABLET | Refills: 0 | Status: SHIPPED | OUTPATIENT
Start: 2021-08-23

## 2021-08-23 RX ORDER — SIMVASTATIN 40 MG
40 TABLET ORAL EVERY EVENING
Qty: 90 TABLET | Refills: 0 | Status: SHIPPED | OUTPATIENT
Start: 2021-08-23

## 2021-08-23 RX ORDER — LEVOTHYROXINE SODIUM 0.03 MG/1
25 TABLET ORAL
Qty: 90 TABLET | Refills: 0 | Status: SHIPPED | OUTPATIENT
Start: 2021-08-23

## 2021-11-26 DIAGNOSIS — F41.1 GENERALIZED ANXIETY DISORDER: ICD-10-CM

## 2021-11-30 RX ORDER — ALPRAZOLAM 0.5 MG/1
0.5 TABLET ORAL 2 TIMES DAILY PRN
Qty: 60 TABLET | Refills: 0 | Status: SHIPPED | OUTPATIENT
Start: 2021-11-30

## 2021-12-03 ENCOUNTER — TELEPHONE (OUTPATIENT)
Dept: FAMILY MEDICINE CLINIC | Facility: CLINIC | Age: 76
End: 2021-12-03

## 2021-12-03 NOTE — TELEPHONE ENCOUNTER
Called and left message to call back to schedule medicare physical exam and follow up. Patient was due to be seen since 9/2021 for follow up.

## 2022-01-16 DIAGNOSIS — F41.1 GENERALIZED ANXIETY DISORDER: ICD-10-CM

## 2022-01-21 RX ORDER — ALPRAZOLAM 0.5 MG/1
TABLET ORAL
Qty: 60 TABLET | Refills: 0 | OUTPATIENT
Start: 2022-01-21

## (undated) NOTE — MR AVS SNAPSHOT
[address] [facility] [address] [facility] [id number] [phone]               Thank you for choosing us for your health care visit with Marvin Kelly DO.   We are glad to serve you and happy to provide you with this rivera Diclofenac Sodium 50 MG Tbec   TAKE ONE TABLET BY MOUTH TWO TIMES A DAY AS NEEDED FOR PAIN   Commonly known as:  VOLTAREN           Levothyroxine Sodium 25 MCG Tabs   TAKE ONE TABLET BY MOUTH EVERY DAY BEFORE BREAKFAST   Commonly known as:  SYNTHROID, LEV progress achieved in OT. Patient will demonstrate AROM of elbow flexion of at least 140 degrees and extension of -10 degrees for ease in bathing. Patient will demonstrate reduced left elbow reduction by 1.0 cm for ease in washing her face.   Patient will

## (undated) NOTE — MR AVS SNAPSHOT
[place]  [address]  [phone]               Thank you for choosing us for your health care visit with Cele Nova MD.  We are glad to serve you and happy to provide you with this summa acquired. Please contact the Patient Business Office at [phone] if you have any questions related to insurance coverage. Thank you.          Reason for Today's Visit     Post-Op           Medical Issues Discussed Today     Closed displaced simple sup Pt complaints of pain in left elbow will decrease at worst to 1/10. Pt will be independent and compliant with comprehensive HEP to maintain progress achieved in OT.   Patient will demonstrate AROM of elbow flexion of at least 140 degrees and extension of -